# Patient Record
Sex: FEMALE | Race: WHITE | NOT HISPANIC OR LATINO | Employment: OTHER | URBAN - METROPOLITAN AREA
[De-identification: names, ages, dates, MRNs, and addresses within clinical notes are randomized per-mention and may not be internally consistent; named-entity substitution may affect disease eponyms.]

---

## 2017-06-12 ENCOUNTER — RECORDS - HEALTHEAST (OUTPATIENT)
Dept: LAB | Facility: CLINIC | Age: 67
End: 2017-06-12

## 2017-06-12 LAB
CHOLEST SERPL-MCNC: 175 MG/DL
FASTING STATUS PATIENT QL REPORTED: ABNORMAL
HDLC SERPL-MCNC: 49 MG/DL
LDLC SERPL CALC-MCNC: 101 MG/DL
TRIGL SERPL-MCNC: 125 MG/DL

## 2018-01-08 ENCOUNTER — RECORDS - HEALTHEAST (OUTPATIENT)
Dept: LAB | Facility: CLINIC | Age: 68
End: 2018-01-08

## 2018-01-08 LAB
ANION GAP SERPL CALCULATED.3IONS-SCNC: 11 MMOL/L (ref 5–18)
BUN SERPL-MCNC: 13 MG/DL (ref 8–22)
CALCIUM SERPL-MCNC: 9.5 MG/DL (ref 8.5–10.5)
CHLORIDE BLD-SCNC: 103 MMOL/L (ref 98–107)
CHOLEST SERPL-MCNC: 209 MG/DL
CO2 SERPL-SCNC: 24 MMOL/L (ref 22–31)
CREAT SERPL-MCNC: 0.73 MG/DL (ref 0.6–1.1)
FASTING STATUS PATIENT QL REPORTED: ABNORMAL
GFR SERPL CREATININE-BSD FRML MDRD: >60 ML/MIN/1.73M2
GLUCOSE BLD-MCNC: 94 MG/DL (ref 70–125)
HDLC SERPL-MCNC: 55 MG/DL
LDLC SERPL CALC-MCNC: 130 MG/DL
POTASSIUM BLD-SCNC: 3.9 MMOL/L (ref 3.5–5)
SODIUM SERPL-SCNC: 138 MMOL/L (ref 136–145)
TRIGL SERPL-MCNC: 122 MG/DL

## 2018-01-09 LAB — 25(OH)D3 SERPL-MCNC: 62.7 NG/ML (ref 30–80)

## 2018-06-11 ENCOUNTER — RECORDS - HEALTHEAST (OUTPATIENT)
Dept: LAB | Facility: CLINIC | Age: 68
End: 2018-06-11

## 2018-06-11 LAB
ANION GAP SERPL CALCULATED.3IONS-SCNC: 10 MMOL/L (ref 5–18)
BUN SERPL-MCNC: 13 MG/DL (ref 8–22)
CALCIUM SERPL-MCNC: 10 MG/DL (ref 8.5–10.5)
CHLORIDE BLD-SCNC: 104 MMOL/L (ref 98–107)
CHOLEST SERPL-MCNC: 156 MG/DL
CO2 SERPL-SCNC: 25 MMOL/L (ref 22–31)
CREAT SERPL-MCNC: 0.76 MG/DL (ref 0.6–1.1)
FASTING STATUS PATIENT QL REPORTED: NORMAL
GFR SERPL CREATININE-BSD FRML MDRD: >60 ML/MIN/1.73M2
GLUCOSE BLD-MCNC: 101 MG/DL (ref 70–125)
HDLC SERPL-MCNC: 53 MG/DL
LDLC SERPL CALC-MCNC: 90 MG/DL
MAGNESIUM SERPL-MCNC: 1.9 MG/DL (ref 1.8–2.6)
POTASSIUM BLD-SCNC: 3.7 MMOL/L (ref 3.5–5)
SODIUM SERPL-SCNC: 139 MMOL/L (ref 136–145)
TRIGL SERPL-MCNC: 64 MG/DL

## 2018-11-12 ENCOUNTER — RECORDS - HEALTHEAST (OUTPATIENT)
Dept: LAB | Facility: CLINIC | Age: 68
End: 2018-11-12

## 2018-11-12 LAB
ANION GAP SERPL CALCULATED.3IONS-SCNC: 10 MMOL/L (ref 5–18)
BUN SERPL-MCNC: 17 MG/DL (ref 8–22)
CALCIUM SERPL-MCNC: 9.9 MG/DL (ref 8.5–10.5)
CHLORIDE BLD-SCNC: 102 MMOL/L (ref 98–107)
CHOLEST SERPL-MCNC: 184 MG/DL
CO2 SERPL-SCNC: 28 MMOL/L (ref 22–31)
CREAT SERPL-MCNC: 0.73 MG/DL (ref 0.6–1.1)
FASTING STATUS PATIENT QL REPORTED: YES
GFR SERPL CREATININE-BSD FRML MDRD: >60 ML/MIN/1.73M2
GLUCOSE BLD-MCNC: 93 MG/DL (ref 70–125)
HDLC SERPL-MCNC: 75 MG/DL
LDLC SERPL CALC-MCNC: 99 MG/DL
POTASSIUM BLD-SCNC: 4 MMOL/L (ref 3.5–5)
SODIUM SERPL-SCNC: 140 MMOL/L (ref 136–145)
TRIGL SERPL-MCNC: 50 MG/DL

## 2019-06-14 ENCOUNTER — RECORDS - HEALTHEAST (OUTPATIENT)
Dept: LAB | Facility: CLINIC | Age: 69
End: 2019-06-14

## 2019-06-14 LAB
ANION GAP SERPL CALCULATED.3IONS-SCNC: 14 MMOL/L (ref 5–18)
BUN SERPL-MCNC: 13 MG/DL (ref 8–22)
CALCIUM SERPL-MCNC: 9.8 MG/DL (ref 8.5–10.5)
CHLORIDE BLD-SCNC: 100 MMOL/L (ref 98–107)
CHOLEST SERPL-MCNC: 173 MG/DL
CO2 SERPL-SCNC: 22 MMOL/L (ref 22–31)
CREAT SERPL-MCNC: 0.78 MG/DL (ref 0.6–1.1)
FASTING STATUS PATIENT QL REPORTED: NORMAL
GFR SERPL CREATININE-BSD FRML MDRD: >60 ML/MIN/1.73M2
GLUCOSE BLD-MCNC: 95 MG/DL (ref 70–125)
HDLC SERPL-MCNC: 68 MG/DL
LDLC SERPL CALC-MCNC: 95 MG/DL
MAGNESIUM SERPL-MCNC: 1.9 MG/DL (ref 1.8–2.6)
POTASSIUM BLD-SCNC: 3.8 MMOL/L (ref 3.5–5)
SODIUM SERPL-SCNC: 136 MMOL/L (ref 136–145)
TRIGL SERPL-MCNC: 48 MG/DL

## 2019-06-17 LAB — 25(OH)D3 SERPL-MCNC: 61.7 NG/ML (ref 30–80)

## 2019-12-19 ENCOUNTER — RECORDS - HEALTHEAST (OUTPATIENT)
Dept: LAB | Facility: CLINIC | Age: 69
End: 2019-12-19

## 2019-12-19 LAB
ANION GAP SERPL CALCULATED.3IONS-SCNC: 7 MMOL/L (ref 5–18)
BUN SERPL-MCNC: 14 MG/DL (ref 8–22)
CALCIUM SERPL-MCNC: 9.7 MG/DL (ref 8.5–10.5)
CHLORIDE BLD-SCNC: 103 MMOL/L (ref 98–107)
CHOLEST SERPL-MCNC: 189 MG/DL
CO2 SERPL-SCNC: 27 MMOL/L (ref 22–31)
CREAT SERPL-MCNC: 0.78 MG/DL (ref 0.6–1.1)
FASTING STATUS PATIENT QL REPORTED: NORMAL
GFR SERPL CREATININE-BSD FRML MDRD: >60 ML/MIN/1.73M2
GLUCOSE BLD-MCNC: 109 MG/DL (ref 70–125)
HDLC SERPL-MCNC: 74 MG/DL
LDLC SERPL CALC-MCNC: 106 MG/DL
POTASSIUM BLD-SCNC: 4 MMOL/L (ref 3.5–5)
SODIUM SERPL-SCNC: 137 MMOL/L (ref 136–145)
TRIGL SERPL-MCNC: 43 MG/DL

## 2020-06-22 ENCOUNTER — RECORDS - HEALTHEAST (OUTPATIENT)
Dept: LAB | Facility: CLINIC | Age: 70
End: 2020-06-22

## 2020-06-22 LAB
ANION GAP SERPL CALCULATED.3IONS-SCNC: 13 MMOL/L (ref 5–18)
BUN SERPL-MCNC: 11 MG/DL (ref 8–28)
CALCIUM SERPL-MCNC: 9.7 MG/DL (ref 8.5–10.5)
CHLORIDE BLD-SCNC: 97 MMOL/L (ref 98–107)
CHOLEST SERPL-MCNC: 126 MG/DL
CO2 SERPL-SCNC: 23 MMOL/L (ref 22–31)
CREAT SERPL-MCNC: 0.78 MG/DL (ref 0.6–1.1)
FASTING STATUS PATIENT QL REPORTED: ABNORMAL
GFR SERPL CREATININE-BSD FRML MDRD: >60 ML/MIN/1.73M2
GLUCOSE BLD-MCNC: 75 MG/DL (ref 70–125)
HDLC SERPL-MCNC: 48 MG/DL
LDLC SERPL CALC-MCNC: 66 MG/DL
MAGNESIUM SERPL-MCNC: 1.9 MG/DL (ref 1.8–2.6)
POTASSIUM BLD-SCNC: 3.3 MMOL/L (ref 3.5–5)
SODIUM SERPL-SCNC: 133 MMOL/L (ref 136–145)
TRIGL SERPL-MCNC: 60 MG/DL

## 2020-06-23 LAB — 25(OH)D3 SERPL-MCNC: 69.4 NG/ML (ref 30–80)

## 2020-10-02 ENCOUNTER — RECORDS - HEALTHEAST (OUTPATIENT)
Dept: LAB | Facility: CLINIC | Age: 70
End: 2020-10-02

## 2020-10-02 LAB — HIV 1+2 AB+HIV1 P24 AG SERPL QL IA: NEGATIVE

## 2020-10-05 LAB — HCV AB SERPL QL IA: NEGATIVE

## 2020-10-06 LAB
C TRACH DNA SPEC QL PROBE+SIG AMP: NEGATIVE
N GONORRHOEA DNA SPEC QL NAA+PROBE: NEGATIVE

## 2020-12-17 ENCOUNTER — RECORDS - HEALTHEAST (OUTPATIENT)
Dept: LAB | Facility: CLINIC | Age: 70
End: 2020-12-17

## 2020-12-17 LAB
ANION GAP SERPL CALCULATED.3IONS-SCNC: 10 MMOL/L (ref 5–18)
BUN SERPL-MCNC: 18 MG/DL (ref 8–28)
CALCIUM SERPL-MCNC: 9.5 MG/DL (ref 8.5–10.5)
CHLORIDE BLD-SCNC: 104 MMOL/L (ref 98–107)
CHOLEST SERPL-MCNC: 182 MG/DL
CO2 SERPL-SCNC: 24 MMOL/L (ref 22–31)
CREAT SERPL-MCNC: 0.83 MG/DL (ref 0.6–1.1)
FASTING STATUS PATIENT QL REPORTED: NORMAL
GFR SERPL CREATININE-BSD FRML MDRD: >60 ML/MIN/1.73M2
GLUCOSE BLD-MCNC: 105 MG/DL (ref 70–125)
HDLC SERPL-MCNC: 69 MG/DL
LDLC SERPL CALC-MCNC: 90 MG/DL
POTASSIUM BLD-SCNC: 3.7 MMOL/L (ref 3.5–5)
SODIUM SERPL-SCNC: 138 MMOL/L (ref 136–145)
TRIGL SERPL-MCNC: 113 MG/DL

## 2021-03-29 ENCOUNTER — RECORDS - HEALTHEAST (OUTPATIENT)
Dept: ADMINISTRATIVE | Facility: OTHER | Age: 71
End: 2021-03-29

## 2021-05-19 ENCOUNTER — RECORDS - HEALTHEAST (OUTPATIENT)
Dept: LAB | Facility: CLINIC | Age: 71
End: 2021-05-19

## 2021-05-19 LAB
ANION GAP SERPL CALCULATED.3IONS-SCNC: 10 MMOL/L (ref 5–18)
BUN SERPL-MCNC: 14 MG/DL (ref 8–28)
CALCIUM SERPL-MCNC: 8.9 MG/DL (ref 8.5–10.5)
CHLORIDE BLD-SCNC: 100 MMOL/L (ref 98–107)
CHOLEST SERPL-MCNC: 162 MG/DL
CO2 SERPL-SCNC: 24 MMOL/L (ref 22–31)
CREAT SERPL-MCNC: 0.75 MG/DL (ref 0.6–1.1)
FASTING STATUS PATIENT QL REPORTED: NORMAL
GFR SERPL CREATININE-BSD FRML MDRD: >60 ML/MIN/1.73M2
GLUCOSE BLD-MCNC: 101 MG/DL (ref 70–125)
HDLC SERPL-MCNC: 59 MG/DL
LDLC SERPL CALC-MCNC: 89 MG/DL
POTASSIUM BLD-SCNC: 3.4 MMOL/L (ref 3.5–5)
SODIUM SERPL-SCNC: 134 MMOL/L (ref 136–145)
TRIGL SERPL-MCNC: 68 MG/DL

## 2021-05-20 LAB — 25(OH)D3 SERPL-MCNC: 54.4 NG/ML (ref 30–80)

## 2021-05-24 ENCOUNTER — RECORDS - HEALTHEAST (OUTPATIENT)
Dept: ADMINISTRATIVE | Facility: CLINIC | Age: 71
End: 2021-05-24

## 2021-05-28 ENCOUNTER — RECORDS - HEALTHEAST (OUTPATIENT)
Dept: ADMINISTRATIVE | Facility: CLINIC | Age: 71
End: 2021-05-28

## 2021-05-29 ENCOUNTER — RECORDS - HEALTHEAST (OUTPATIENT)
Dept: ADMINISTRATIVE | Facility: CLINIC | Age: 71
End: 2021-05-29

## 2021-06-01 ENCOUNTER — RECORDS - HEALTHEAST (OUTPATIENT)
Dept: ADMINISTRATIVE | Facility: CLINIC | Age: 71
End: 2021-06-01

## 2021-06-02 ENCOUNTER — RECORDS - HEALTHEAST (OUTPATIENT)
Dept: ADMINISTRATIVE | Facility: CLINIC | Age: 71
End: 2021-06-02

## 2021-07-13 ENCOUNTER — RECORDS - HEALTHEAST (OUTPATIENT)
Dept: ADMINISTRATIVE | Facility: CLINIC | Age: 71
End: 2021-07-13

## 2021-07-21 ENCOUNTER — RECORDS - HEALTHEAST (OUTPATIENT)
Dept: ADMINISTRATIVE | Facility: CLINIC | Age: 71
End: 2021-07-21

## 2021-11-24 ENCOUNTER — LAB REQUISITION (OUTPATIENT)
Dept: LAB | Facility: CLINIC | Age: 71
End: 2021-11-24
Payer: MEDICARE

## 2021-11-24 DIAGNOSIS — I10 ESSENTIAL (PRIMARY) HYPERTENSION: ICD-10-CM

## 2021-11-24 DIAGNOSIS — E78.2 MIXED HYPERLIPIDEMIA: ICD-10-CM

## 2021-11-24 LAB
ANION GAP SERPL CALCULATED.3IONS-SCNC: 10 MMOL/L (ref 5–18)
BUN SERPL-MCNC: 17 MG/DL (ref 8–28)
CALCIUM SERPL-MCNC: 10 MG/DL (ref 8.5–10.5)
CHLORIDE BLD-SCNC: 102 MMOL/L (ref 98–107)
CHOLEST SERPL-MCNC: 181 MG/DL
CO2 SERPL-SCNC: 26 MMOL/L (ref 22–31)
CREAT SERPL-MCNC: 0.75 MG/DL (ref 0.6–1.1)
GFR SERPL CREATININE-BSD FRML MDRD: 80 ML/MIN/1.73M2
GLUCOSE BLD-MCNC: 99 MG/DL (ref 70–125)
HDLC SERPL-MCNC: 69 MG/DL
LDLC SERPL CALC-MCNC: 89 MG/DL
MAGNESIUM SERPL-MCNC: 2 MG/DL (ref 1.8–2.6)
POTASSIUM BLD-SCNC: 5.1 MMOL/L (ref 3.5–5)
SODIUM SERPL-SCNC: 138 MMOL/L (ref 136–145)
TRIGL SERPL-MCNC: 115 MG/DL

## 2021-11-24 PROCEDURE — 80048 BASIC METABOLIC PNL TOTAL CA: CPT | Mod: ORL | Performed by: FAMILY MEDICINE

## 2021-11-24 PROCEDURE — 83735 ASSAY OF MAGNESIUM: CPT | Mod: ORL | Performed by: FAMILY MEDICINE

## 2021-11-24 PROCEDURE — 80061 LIPID PANEL: CPT | Mod: ORL | Performed by: FAMILY MEDICINE

## 2022-05-23 ENCOUNTER — LAB REQUISITION (OUTPATIENT)
Dept: LAB | Facility: CLINIC | Age: 72
End: 2022-05-23
Payer: MEDICARE

## 2022-05-23 ENCOUNTER — TRANSFERRED RECORDS (OUTPATIENT)
Dept: HEALTH INFORMATION MANAGEMENT | Facility: CLINIC | Age: 72
End: 2022-05-23

## 2022-05-23 DIAGNOSIS — Z01.818 ENCOUNTER FOR OTHER PREPROCEDURAL EXAMINATION: ICD-10-CM

## 2022-05-23 LAB
ANION GAP SERPL CALCULATED.3IONS-SCNC: 9 MMOL/L (ref 5–18)
BUN SERPL-MCNC: 18 MG/DL (ref 8–28)
CALCIUM SERPL-MCNC: 9.7 MG/DL (ref 8.5–10.5)
CHLORIDE BLD-SCNC: 101 MMOL/L (ref 98–107)
CO2 SERPL-SCNC: 29 MMOL/L (ref 22–31)
CREAT SERPL-MCNC: 0.72 MG/DL (ref 0.6–1.1)
GFR SERPL CREATININE-BSD FRML MDRD: 88 ML/MIN/1.73M2
GLUCOSE BLD-MCNC: 63 MG/DL (ref 70–125)
POTASSIUM BLD-SCNC: 3.6 MMOL/L (ref 3.5–5)
SODIUM SERPL-SCNC: 139 MMOL/L (ref 136–145)

## 2022-05-23 PROCEDURE — 80048 BASIC METABOLIC PNL TOTAL CA: CPT | Mod: ORL | Performed by: FAMILY MEDICINE

## 2022-06-17 RX ORDER — LORATADINE 10 MG/1
10 TABLET ORAL DAILY
COMMUNITY

## 2022-06-17 RX ORDER — TRIAMTERENE AND HYDROCHLOROTHIAZIDE 37.5; 25 MG/1; MG/1
CAPSULE ORAL EVERY MORNING
Status: ON HOLD | COMMUNITY
End: 2022-06-22

## 2022-06-17 RX ORDER — CHLORAL HYDRATE 500 MG
1000 CAPSULE ORAL 2 TIMES DAILY
COMMUNITY

## 2022-06-17 RX ORDER — SODIUM PHOSPHATE,MONO-DIBASIC 19G-7G/118
1 ENEMA (ML) RECTAL DAILY
COMMUNITY

## 2022-06-17 RX ORDER — UBIDECARENONE 100 MG
100 CAPSULE ORAL DAILY
COMMUNITY

## 2022-06-18 ENCOUNTER — LAB (OUTPATIENT)
Dept: FAMILY MEDICINE | Facility: CLINIC | Age: 72
End: 2022-06-18
Payer: MEDICARE

## 2022-06-18 DIAGNOSIS — Z20.822 ENCOUNTER FOR LABORATORY TESTING FOR COVID-19 VIRUS: ICD-10-CM

## 2022-06-18 PROCEDURE — U0003 INFECTIOUS AGENT DETECTION BY NUCLEIC ACID (DNA OR RNA); SEVERE ACUTE RESPIRATORY SYNDROME CORONAVIRUS 2 (SARS-COV-2) (CORONAVIRUS DISEASE [COVID-19]), AMPLIFIED PROBE TECHNIQUE, MAKING USE OF HIGH THROUGHPUT TECHNOLOGIES AS DESCRIBED BY CMS-2020-01-R: HCPCS

## 2022-06-18 PROCEDURE — U0005 INFEC AGEN DETEC AMPLI PROBE: HCPCS

## 2022-06-19 LAB — SARS-COV-2 RNA RESP QL NAA+PROBE: NEGATIVE

## 2022-06-21 ENCOUNTER — ANESTHESIA EVENT (OUTPATIENT)
Dept: SURGERY | Facility: CLINIC | Age: 72
End: 2022-06-21
Payer: MEDICARE

## 2022-06-22 ENCOUNTER — ANESTHESIA (OUTPATIENT)
Dept: SURGERY | Facility: CLINIC | Age: 72
End: 2022-06-22
Payer: MEDICARE

## 2022-06-22 ENCOUNTER — HOSPITAL ENCOUNTER (OUTPATIENT)
Facility: CLINIC | Age: 72
Discharge: HOME | End: 2022-06-23
Attending: ORTHOPAEDIC SURGERY | Admitting: ORTHOPAEDIC SURGERY
Payer: MEDICARE

## 2022-06-22 ENCOUNTER — APPOINTMENT (OUTPATIENT)
Dept: RADIOLOGY | Facility: CLINIC | Age: 72
End: 2022-06-22
Attending: PHYSICIAN ASSISTANT
Payer: MEDICARE

## 2022-06-22 DIAGNOSIS — I10 PRIMARY HYPERTENSION: ICD-10-CM

## 2022-06-22 DIAGNOSIS — Z98.890 STATUS POST SHOULDER SURGERY: Primary | ICD-10-CM

## 2022-06-22 LAB
ABO/RH(D): NORMAL
ANTIBODY SCREEN: NEGATIVE
APTT PPP: 31 SECONDS (ref 22–38)
CREAT SERPL-MCNC: 0.74 MG/DL (ref 0.6–1.1)
ERYTHROCYTE [DISTWIDTH] IN BLOOD BY AUTOMATED COUNT: 12.6 % (ref 10–15)
GFR SERPL CREATININE-BSD FRML MDRD: 85 ML/MIN/1.73M2
HCT VFR BLD AUTO: 42.3 % (ref 35–47)
HGB BLD-MCNC: 13.6 G/DL (ref 11.7–15.7)
INR PPP: 1.02 (ref 0.85–1.15)
MCH RBC QN AUTO: 30.7 PG (ref 26.5–33)
MCHC RBC AUTO-ENTMCNC: 32.2 G/DL (ref 31.5–36.5)
MCV RBC AUTO: 96 FL (ref 78–100)
PLATELET # BLD AUTO: 210 10E3/UL (ref 150–450)
POTASSIUM BLD-SCNC: 3.7 MMOL/L (ref 3.5–5)
RBC # BLD AUTO: 4.43 10E6/UL (ref 3.8–5.2)
SPECIMEN EXPIRATION DATE: NORMAL
WBC # BLD AUTO: 5.4 10E3/UL (ref 4–11)

## 2022-06-22 PROCEDURE — C1713 ANCHOR/SCREW BN/BN,TIS/BN: HCPCS | Performed by: ORTHOPAEDIC SURGERY

## 2022-06-22 PROCEDURE — 250N000013 HC RX MED GY IP 250 OP 250 PS 637: Performed by: HOSPITALIST

## 2022-06-22 PROCEDURE — 85730 THROMBOPLASTIN TIME PARTIAL: CPT | Mod: GZ | Performed by: PHYSICIAN ASSISTANT

## 2022-06-22 PROCEDURE — 250N000009 HC RX 250: Performed by: NURSE ANESTHETIST, CERTIFIED REGISTERED

## 2022-06-22 PROCEDURE — 250N000011 HC RX IP 250 OP 636

## 2022-06-22 PROCEDURE — 250N000011 HC RX IP 250 OP 636: Performed by: ORTHOPAEDIC SURGERY

## 2022-06-22 PROCEDURE — 258N000003 HC RX IP 258 OP 636: Performed by: PHYSICIAN ASSISTANT

## 2022-06-22 PROCEDURE — 360N000077 HC SURGERY LEVEL 4, PER MIN: Performed by: ORTHOPAEDIC SURGERY

## 2022-06-22 PROCEDURE — 999N000065 XR SHOULDER RIGHT PORT G/E 2 VIEWS: Mod: RT

## 2022-06-22 PROCEDURE — 250N000013 HC RX MED GY IP 250 OP 250 PS 637: Performed by: PHYSICIAN ASSISTANT

## 2022-06-22 PROCEDURE — 999N000141 HC STATISTIC PRE-PROCEDURE NURSING ASSESSMENT: Performed by: ORTHOPAEDIC SURGERY

## 2022-06-22 PROCEDURE — 258N000003 HC RX IP 258 OP 636: Performed by: ANESTHESIOLOGY

## 2022-06-22 PROCEDURE — 82565 ASSAY OF CREATININE: CPT | Performed by: PHYSICIAN ASSISTANT

## 2022-06-22 PROCEDURE — 250N000009 HC RX 250

## 2022-06-22 PROCEDURE — 84132 ASSAY OF SERUM POTASSIUM: CPT | Performed by: PHYSICIAN ASSISTANT

## 2022-06-22 PROCEDURE — 250N000013 HC RX MED GY IP 250 OP 250 PS 637: Performed by: ANESTHESIOLOGY

## 2022-06-22 PROCEDURE — C1776 JOINT DEVICE (IMPLANTABLE): HCPCS | Performed by: ORTHOPAEDIC SURGERY

## 2022-06-22 PROCEDURE — 36415 COLL VENOUS BLD VENIPUNCTURE: CPT | Performed by: PHYSICIAN ASSISTANT

## 2022-06-22 PROCEDURE — 370N000017 HC ANESTHESIA TECHNICAL FEE, PER MIN: Performed by: ORTHOPAEDIC SURGERY

## 2022-06-22 PROCEDURE — 99213 OFFICE O/P EST LOW 20 MIN: CPT | Performed by: HOSPITALIST

## 2022-06-22 PROCEDURE — 85610 PROTHROMBIN TIME: CPT | Performed by: PHYSICIAN ASSISTANT

## 2022-06-22 PROCEDURE — C9290 INJ, BUPIVACAINE LIPOSOME: HCPCS | Performed by: PHYSICIAN ASSISTANT

## 2022-06-22 PROCEDURE — 86850 RBC ANTIBODY SCREEN: CPT | Performed by: PHYSICIAN ASSISTANT

## 2022-06-22 PROCEDURE — 250N000011 HC RX IP 250 OP 636: Performed by: PHYSICIAN ASSISTANT

## 2022-06-22 PROCEDURE — 250N000009 HC RX 250: Performed by: ANESTHESIOLOGY

## 2022-06-22 PROCEDURE — 258N000003 HC RX IP 258 OP 636: Performed by: NURSE ANESTHETIST, CERTIFIED REGISTERED

## 2022-06-22 PROCEDURE — 250N000011 HC RX IP 250 OP 636: Performed by: ANESTHESIOLOGY

## 2022-06-22 PROCEDURE — 250N000011 HC RX IP 250 OP 636: Performed by: NURSE ANESTHETIST, CERTIFIED REGISTERED

## 2022-06-22 PROCEDURE — 250N000009 HC RX 250: Performed by: ORTHOPAEDIC SURGERY

## 2022-06-22 PROCEDURE — 85014 HEMATOCRIT: CPT | Performed by: PHYSICIAN ASSISTANT

## 2022-06-22 PROCEDURE — 250N000025 HC SEVOFLURANE, PER MIN: Performed by: ORTHOPAEDIC SURGERY

## 2022-06-22 PROCEDURE — 272N000001 HC OR GENERAL SUPPLY STERILE: Performed by: ORTHOPAEDIC SURGERY

## 2022-06-22 PROCEDURE — 710N000010 HC RECOVERY PHASE 1, LEVEL 2, PER MIN: Performed by: ORTHOPAEDIC SURGERY

## 2022-06-22 DEVICE — IMPLANTABLE DEVICE
Type: IMPLANTABLE DEVICE | Site: SHOULDER | Status: FUNCTIONAL
Brand: FLEX SHOULDER SYSTEM

## 2022-06-22 DEVICE — SCREW PERIPHERAL 22MM: Type: IMPLANTABLE DEVICE | Site: SHOULDER | Status: FUNCTIONAL

## 2022-06-22 DEVICE — SCREW BSPLT 25MM 6.5MM AQLS PRFRM GLND RVRS CNTR DWJ125: Type: IMPLANTABLE DEVICE | Site: SHOULDER | Status: FUNCTIONAL

## 2022-06-22 DEVICE — SCREW PERIPHERAL 26MM: Type: IMPLANTABLE DEVICE | Site: SHOULDER | Status: FUNCTIONAL

## 2022-06-22 DEVICE — BASEPLATE STD 25MM: Type: IMPLANTABLE DEVICE | Site: SHOULDER | Status: FUNCTIONAL

## 2022-06-22 DEVICE — SCREW PERIPHERAL 14MM DWJ314: Type: IMPLANTABLE DEVICE | Site: SHOULDER | Status: FUNCTIONAL

## 2022-06-22 DEVICE — IMPLANTABLE DEVICE
Type: IMPLANTABLE DEVICE | Site: SHOULDER | Status: FUNCTIONAL
Brand: TORNIER FLEX SHOULDER SYSTEM

## 2022-06-22 DEVICE — IMPLANTABLE DEVICE
Type: IMPLANTABLE DEVICE | Site: SHOULDER | Status: FUNCTIONAL
Brand: AEQUALIS™ PERFORM REVERSED

## 2022-06-22 RX ORDER — BISACODYL 10 MG
10 SUPPOSITORY, RECTAL RECTAL DAILY PRN
Status: DISCONTINUED | OUTPATIENT
Start: 2022-06-22 | End: 2022-06-23 | Stop reason: HOSPADM

## 2022-06-22 RX ORDER — HYDROMORPHONE HCL IN WATER/PF 6 MG/30 ML
0.4 PATIENT CONTROLLED ANALGESIA SYRINGE INTRAVENOUS EVERY 5 MIN PRN
Status: DISCONTINUED | OUTPATIENT
Start: 2022-06-22 | End: 2022-06-22 | Stop reason: HOSPADM

## 2022-06-22 RX ORDER — TRAMADOL HYDROCHLORIDE 50 MG/1
50 TABLET ORAL EVERY 6 HOURS PRN
Status: DISCONTINUED | OUTPATIENT
Start: 2022-06-22 | End: 2022-06-23 | Stop reason: HOSPADM

## 2022-06-22 RX ORDER — FENTANYL CITRATE 50 UG/ML
50 INJECTION, SOLUTION INTRAMUSCULAR; INTRAVENOUS EVERY 5 MIN PRN
Status: DISCONTINUED | OUTPATIENT
Start: 2022-06-22 | End: 2022-06-22 | Stop reason: HOSPADM

## 2022-06-22 RX ORDER — ONDANSETRON 2 MG/ML
4 INJECTION INTRAMUSCULAR; INTRAVENOUS EVERY 6 HOURS PRN
Status: DISCONTINUED | OUTPATIENT
Start: 2022-06-22 | End: 2022-06-23 | Stop reason: HOSPADM

## 2022-06-22 RX ORDER — NALOXONE HYDROCHLORIDE 0.4 MG/ML
0.4 INJECTION, SOLUTION INTRAMUSCULAR; INTRAVENOUS; SUBCUTANEOUS
Status: DISCONTINUED | OUTPATIENT
Start: 2022-06-22 | End: 2022-06-23 | Stop reason: HOSPADM

## 2022-06-22 RX ORDER — NALOXONE HYDROCHLORIDE 0.4 MG/ML
0.2 INJECTION, SOLUTION INTRAMUSCULAR; INTRAVENOUS; SUBCUTANEOUS
Status: DISCONTINUED | OUTPATIENT
Start: 2022-06-22 | End: 2022-06-23 | Stop reason: HOSPADM

## 2022-06-22 RX ORDER — SODIUM CHLORIDE, SODIUM LACTATE, POTASSIUM CHLORIDE, CALCIUM CHLORIDE 600; 310; 30; 20 MG/100ML; MG/100ML; MG/100ML; MG/100ML
INJECTION, SOLUTION INTRAVENOUS CONTINUOUS
Status: DISCONTINUED | OUTPATIENT
Start: 2022-06-22 | End: 2022-06-23 | Stop reason: HOSPADM

## 2022-06-22 RX ORDER — HYDROCODONE BITARTRATE AND ACETAMINOPHEN 5; 325 MG/1; MG/1
1-2 TABLET ORAL EVERY 6 HOURS PRN
Status: DISCONTINUED | OUTPATIENT
Start: 2022-06-22 | End: 2022-06-22 | Stop reason: ALTCHOICE

## 2022-06-22 RX ORDER — CEFAZOLIN SODIUM 2 G/100ML
2 INJECTION, SOLUTION INTRAVENOUS EVERY 8 HOURS
Status: COMPLETED | OUTPATIENT
Start: 2022-06-22 | End: 2022-06-23

## 2022-06-22 RX ORDER — HYDROMORPHONE HCL IN WATER/PF 6 MG/30 ML
0.2 PATIENT CONTROLLED ANALGESIA SYRINGE INTRAVENOUS
Status: DISCONTINUED | OUTPATIENT
Start: 2022-06-22 | End: 2022-06-23 | Stop reason: HOSPADM

## 2022-06-22 RX ORDER — BUPIVACAINE HYDROCHLORIDE 5 MG/ML
INJECTION, SOLUTION EPIDURAL; INTRACAUDAL
Status: COMPLETED | OUTPATIENT
Start: 2022-06-22 | End: 2022-06-22

## 2022-06-22 RX ORDER — ONDANSETRON 4 MG/1
4 TABLET, ORALLY DISINTEGRATING ORAL EVERY 30 MIN PRN
Status: DISCONTINUED | OUTPATIENT
Start: 2022-06-22 | End: 2022-06-22 | Stop reason: HOSPADM

## 2022-06-22 RX ORDER — DEXAMETHASONE SODIUM PHOSPHATE 4 MG/ML
INJECTION, SOLUTION INTRA-ARTICULAR; INTRALESIONAL; INTRAMUSCULAR; INTRAVENOUS; SOFT TISSUE PRN
Status: DISCONTINUED | OUTPATIENT
Start: 2022-06-22 | End: 2022-06-22

## 2022-06-22 RX ORDER — ACETAMINOPHEN 325 MG/1
975 TABLET ORAL EVERY 8 HOURS
Status: DISCONTINUED | OUTPATIENT
Start: 2022-06-22 | End: 2022-06-23 | Stop reason: HOSPADM

## 2022-06-22 RX ORDER — ACETAMINOPHEN 325 MG/1
975 TABLET ORAL ONCE
Status: COMPLETED | OUTPATIENT
Start: 2022-06-22 | End: 2022-06-22

## 2022-06-22 RX ORDER — AMOXICILLIN 250 MG
1 CAPSULE ORAL 2 TIMES DAILY
Status: DISCONTINUED | OUTPATIENT
Start: 2022-06-22 | End: 2022-06-23 | Stop reason: HOSPADM

## 2022-06-22 RX ORDER — VANCOMYCIN HYDROCHLORIDE 1 G/20ML
1 INJECTION, POWDER, LYOPHILIZED, FOR SOLUTION INTRAVENOUS ONCE
Status: DISCONTINUED | OUTPATIENT
Start: 2022-06-22 | End: 2022-06-22 | Stop reason: HOSPADM

## 2022-06-22 RX ORDER — LIDOCAINE 40 MG/G
CREAM TOPICAL
Status: DISCONTINUED | OUTPATIENT
Start: 2022-06-22 | End: 2022-06-23 | Stop reason: HOSPADM

## 2022-06-22 RX ORDER — ACETAMINOPHEN 325 MG/1
650 TABLET ORAL EVERY 4 HOURS PRN
Status: DISCONTINUED | OUTPATIENT
Start: 2022-06-25 | End: 2022-06-23 | Stop reason: HOSPADM

## 2022-06-22 RX ORDER — MAGNESIUM HYDROXIDE 1200 MG/15ML
LIQUID ORAL PRN
Status: DISCONTINUED | OUTPATIENT
Start: 2022-06-22 | End: 2022-06-22 | Stop reason: HOSPADM

## 2022-06-22 RX ORDER — DIPHENHYDRAMINE HYDROCHLORIDE 50 MG/ML
12.5 INJECTION INTRAMUSCULAR; INTRAVENOUS EVERY 6 HOURS PRN
Status: DISCONTINUED | OUTPATIENT
Start: 2022-06-22 | End: 2022-06-22 | Stop reason: HOSPADM

## 2022-06-22 RX ORDER — CEFAZOLIN SODIUM/WATER 2 G/20 ML
2 SYRINGE (ML) INTRAVENOUS
Status: COMPLETED | OUTPATIENT
Start: 2022-06-22 | End: 2022-06-22

## 2022-06-22 RX ORDER — LIDOCAINE 40 MG/G
CREAM TOPICAL
Status: DISCONTINUED | OUTPATIENT
Start: 2022-06-22 | End: 2022-06-22 | Stop reason: HOSPADM

## 2022-06-22 RX ORDER — ACETAMINOPHEN 325 MG/1
975 TABLET ORAL ONCE
Status: DISCONTINUED | OUTPATIENT
Start: 2022-06-22 | End: 2022-06-22 | Stop reason: HOSPADM

## 2022-06-22 RX ORDER — ONDANSETRON 2 MG/ML
4 INJECTION INTRAMUSCULAR; INTRAVENOUS EVERY 30 MIN PRN
Status: DISCONTINUED | OUTPATIENT
Start: 2022-06-22 | End: 2022-06-22 | Stop reason: HOSPADM

## 2022-06-22 RX ORDER — LIDOCAINE HYDROCHLORIDE 10 MG/ML
INJECTION, SOLUTION INFILTRATION; PERINEURAL PRN
Status: DISCONTINUED | OUTPATIENT
Start: 2022-06-22 | End: 2022-06-22

## 2022-06-22 RX ORDER — LISINOPRIL 5 MG/1
5 TABLET ORAL DAILY
Status: DISCONTINUED | OUTPATIENT
Start: 2022-06-23 | End: 2022-06-23 | Stop reason: HOSPADM

## 2022-06-22 RX ORDER — SODIUM CHLORIDE, SODIUM LACTATE, POTASSIUM CHLORIDE, CALCIUM CHLORIDE 600; 310; 30; 20 MG/100ML; MG/100ML; MG/100ML; MG/100ML
INJECTION, SOLUTION INTRAVENOUS CONTINUOUS
Status: DISCONTINUED | OUTPATIENT
Start: 2022-06-22 | End: 2022-06-22 | Stop reason: HOSPADM

## 2022-06-22 RX ORDER — LORATADINE 10 MG/1
10 TABLET ORAL DAILY
Status: DISCONTINUED | OUTPATIENT
Start: 2022-06-23 | End: 2022-06-23 | Stop reason: HOSPADM

## 2022-06-22 RX ORDER — ATORVASTATIN CALCIUM 10 MG/1
10 TABLET, FILM COATED ORAL AT BEDTIME
Status: DISCONTINUED | OUTPATIENT
Start: 2022-06-22 | End: 2022-06-23 | Stop reason: HOSPADM

## 2022-06-22 RX ORDER — PROPOFOL 10 MG/ML
INJECTION, EMULSION INTRAVENOUS CONTINUOUS PRN
Status: DISCONTINUED | OUTPATIENT
Start: 2022-06-22 | End: 2022-06-22

## 2022-06-22 RX ORDER — FENTANYL CITRATE 50 UG/ML
INJECTION, SOLUTION INTRAMUSCULAR; INTRAVENOUS PRN
Status: DISCONTINUED | OUTPATIENT
Start: 2022-06-22 | End: 2022-06-22

## 2022-06-22 RX ORDER — TRIAMTERENE/HYDROCHLOROTHIAZID 37.5-25 MG
1 TABLET ORAL DAILY
COMMUNITY

## 2022-06-22 RX ORDER — HYDROCODONE BITARTRATE AND ACETAMINOPHEN 5; 325 MG/1; MG/1
1-2 TABLET ORAL EVERY 6 HOURS PRN
Qty: 25 TABLET | Refills: 0 | Status: SHIPPED | OUTPATIENT
Start: 2022-06-22 | End: 2024-04-22

## 2022-06-22 RX ORDER — LISINOPRIL 5 MG/1
5 TABLET ORAL AT BEDTIME
Status: DISCONTINUED | OUTPATIENT
Start: 2022-06-22 | End: 2022-06-22

## 2022-06-22 RX ORDER — FENTANYL CITRATE 50 UG/ML
50 INJECTION, SOLUTION INTRAMUSCULAR; INTRAVENOUS
Status: DISCONTINUED | OUTPATIENT
Start: 2022-06-22 | End: 2022-06-22 | Stop reason: HOSPADM

## 2022-06-22 RX ORDER — DIPHENHYDRAMINE HCL 12.5 MG/5ML
12.5 SOLUTION ORAL EVERY 6 HOURS PRN
Status: DISCONTINUED | OUTPATIENT
Start: 2022-06-22 | End: 2022-06-22 | Stop reason: HOSPADM

## 2022-06-22 RX ORDER — VANCOMYCIN HYDROCHLORIDE 1 G/20ML
INJECTION, POWDER, LYOPHILIZED, FOR SOLUTION INTRAVENOUS PRN
Status: DISCONTINUED | OUTPATIENT
Start: 2022-06-22 | End: 2022-06-22 | Stop reason: HOSPADM

## 2022-06-22 RX ORDER — DIAZEPAM 10 MG/2ML
2.5 INJECTION, SOLUTION INTRAMUSCULAR; INTRAVENOUS
Status: DISCONTINUED | OUTPATIENT
Start: 2022-06-22 | End: 2022-06-22 | Stop reason: HOSPADM

## 2022-06-22 RX ORDER — POLYETHYLENE GLYCOL 3350 17 G/17G
17 POWDER, FOR SOLUTION ORAL DAILY
Status: DISCONTINUED | OUTPATIENT
Start: 2022-06-23 | End: 2022-06-23 | Stop reason: HOSPADM

## 2022-06-22 RX ORDER — HYDROMORPHONE HCL IN WATER/PF 6 MG/30 ML
0.4 PATIENT CONTROLLED ANALGESIA SYRINGE INTRAVENOUS
Status: DISCONTINUED | OUTPATIENT
Start: 2022-06-22 | End: 2022-06-23 | Stop reason: HOSPADM

## 2022-06-22 RX ORDER — TRANEXAMIC ACID 650 MG/1
1950 TABLET ORAL ONCE
Status: COMPLETED | OUTPATIENT
Start: 2022-06-22 | End: 2022-06-22

## 2022-06-22 RX ORDER — HALOPERIDOL 5 MG/ML
1 INJECTION INTRAMUSCULAR
Status: DISCONTINUED | OUTPATIENT
Start: 2022-06-22 | End: 2022-06-22 | Stop reason: HOSPADM

## 2022-06-22 RX ORDER — GLYCINE 1.5 G/100ML
SOLUTION IRRIGATION PRN
Status: DISCONTINUED | OUTPATIENT
Start: 2022-06-22 | End: 2022-06-22 | Stop reason: HOSPADM

## 2022-06-22 RX ORDER — CEFAZOLIN SODIUM/WATER 2 G/20 ML
2 SYRINGE (ML) INTRAVENOUS SEE ADMIN INSTRUCTIONS
Status: DISCONTINUED | OUTPATIENT
Start: 2022-06-22 | End: 2022-06-22 | Stop reason: HOSPADM

## 2022-06-22 RX ORDER — EPHEDRINE SULFATE 50 MG/ML
INJECTION, SOLUTION INTRAMUSCULAR; INTRAVENOUS; SUBCUTANEOUS PRN
Status: DISCONTINUED | OUTPATIENT
Start: 2022-06-22 | End: 2022-06-22

## 2022-06-22 RX ORDER — ONDANSETRON 4 MG/1
4 TABLET, ORALLY DISINTEGRATING ORAL EVERY 6 HOURS PRN
Status: DISCONTINUED | OUTPATIENT
Start: 2022-06-22 | End: 2022-06-23 | Stop reason: HOSPADM

## 2022-06-22 RX ORDER — GLYCOPYRROLATE 0.2 MG/ML
INJECTION, SOLUTION INTRAMUSCULAR; INTRAVENOUS PRN
Status: DISCONTINUED | OUTPATIENT
Start: 2022-06-22 | End: 2022-06-22

## 2022-06-22 RX ORDER — PROCHLORPERAZINE MALEATE 5 MG
5 TABLET ORAL EVERY 6 HOURS PRN
Status: DISCONTINUED | OUTPATIENT
Start: 2022-06-22 | End: 2022-06-23 | Stop reason: HOSPADM

## 2022-06-22 RX ORDER — CALCIUM CARBONATE 500 MG/1
500 TABLET, CHEWABLE ORAL 4 TIMES DAILY PRN
Status: DISCONTINUED | OUTPATIENT
Start: 2022-06-22 | End: 2022-06-23 | Stop reason: HOSPADM

## 2022-06-22 RX ORDER — PROPOFOL 10 MG/ML
INJECTION, EMULSION INTRAVENOUS PRN
Status: DISCONTINUED | OUTPATIENT
Start: 2022-06-22 | End: 2022-06-22

## 2022-06-22 RX ADMIN — BUPIVACAINE HYDROCHLORIDE 15 ML: 5 INJECTION, SOLUTION EPIDURAL; INTRACAUDAL; PERINEURAL at 09:32

## 2022-06-22 RX ADMIN — Medication 5 MG: at 12:23

## 2022-06-22 RX ADMIN — SODIUM CHLORIDE, POTASSIUM CHLORIDE, SODIUM LACTATE AND CALCIUM CHLORIDE: 600; 310; 30; 20 INJECTION, SOLUTION INTRAVENOUS at 15:22

## 2022-06-22 RX ADMIN — ATORVASTATIN CALCIUM 10 MG: 10 TABLET, FILM COATED ORAL at 20:17

## 2022-06-22 RX ADMIN — LIDOCAINE HYDROCHLORIDE 2 ML: 10 INJECTION, SOLUTION INFILTRATION; PERINEURAL at 11:43

## 2022-06-22 RX ADMIN — ASPIRIN 325 MG: 325 TABLET ORAL at 17:11

## 2022-06-22 RX ADMIN — Medication 2 G: at 11:29

## 2022-06-22 RX ADMIN — PROPOFOL 200 MCG/KG/MIN: 10 INJECTION, EMULSION INTRAVENOUS at 11:43

## 2022-06-22 RX ADMIN — TRANEXAMIC ACID 1950 MG: 650 TABLET ORAL at 08:10

## 2022-06-22 RX ADMIN — FENTANYL CITRATE 50 MCG: 50 INJECTION, SOLUTION INTRAMUSCULAR; INTRAVENOUS at 09:30

## 2022-06-22 RX ADMIN — SUGAMMADEX 200 MG: 100 INJECTION, SOLUTION INTRAVENOUS at 13:38

## 2022-06-22 RX ADMIN — PHENYLEPHRINE HYDROCHLORIDE 0.3 MCG/KG/MIN: 10 INJECTION INTRAVENOUS at 12:04

## 2022-06-22 RX ADMIN — ACETAMINOPHEN 975 MG: 325 TABLET ORAL at 08:10

## 2022-06-22 RX ADMIN — SODIUM CHLORIDE, POTASSIUM CHLORIDE, SODIUM LACTATE AND CALCIUM CHLORIDE: 600; 310; 30; 20 INJECTION, SOLUTION INTRAVENOUS at 11:41

## 2022-06-22 RX ADMIN — FENTANYL CITRATE 50 MCG: 50 INJECTION, SOLUTION INTRAMUSCULAR; INTRAVENOUS at 13:04

## 2022-06-22 RX ADMIN — PROPOFOL 150 MG: 10 INJECTION, EMULSION INTRAVENOUS at 11:43

## 2022-06-22 RX ADMIN — PHENYLEPHRINE HYDROCHLORIDE 100 MCG: 10 INJECTION INTRAVENOUS at 13:32

## 2022-06-22 RX ADMIN — FENTANYL CITRATE 50 MCG: 50 INJECTION, SOLUTION INTRAMUSCULAR; INTRAVENOUS at 12:14

## 2022-06-22 RX ADMIN — CEFAZOLIN SODIUM 2 G: 2 INJECTION, SOLUTION INTRAVENOUS at 20:13

## 2022-06-22 RX ADMIN — ROCURONIUM BROMIDE 15 MG: 10 INJECTION, SOLUTION INTRAVENOUS at 12:33

## 2022-06-22 RX ADMIN — SENNOSIDES AND DOCUSATE SODIUM 1 TABLET: 50; 8.6 TABLET ORAL at 20:17

## 2022-06-22 RX ADMIN — ACETAMINOPHEN 975 MG: 325 TABLET ORAL at 15:22

## 2022-06-22 RX ADMIN — ROCURONIUM BROMIDE 40 MG: 10 INJECTION, SOLUTION INTRAVENOUS at 11:43

## 2022-06-22 RX ADMIN — MIDAZOLAM HYDROCHLORIDE 1 MG: 1 INJECTION, SOLUTION INTRAMUSCULAR; INTRAVENOUS at 09:30

## 2022-06-22 RX ADMIN — ROCURONIUM BROMIDE 10 MG: 10 INJECTION, SOLUTION INTRAVENOUS at 12:02

## 2022-06-22 RX ADMIN — FENTANYL CITRATE 50 MCG: 50 INJECTION, SOLUTION INTRAMUSCULAR; INTRAVENOUS at 11:43

## 2022-06-22 RX ADMIN — Medication 10 MG: at 12:10

## 2022-06-22 RX ADMIN — BUPIVACAINE 10 ML: 13.3 INJECTION, SUSPENSION, LIPOSOMAL INFILTRATION at 09:32

## 2022-06-22 RX ADMIN — Medication 10 MG: at 13:15

## 2022-06-22 RX ADMIN — SODIUM CHLORIDE, POTASSIUM CHLORIDE, SODIUM LACTATE AND CALCIUM CHLORIDE: 600; 310; 30; 20 INJECTION, SOLUTION INTRAVENOUS at 08:29

## 2022-06-22 RX ADMIN — SODIUM CHLORIDE, POTASSIUM CHLORIDE, SODIUM LACTATE AND CALCIUM CHLORIDE: 600; 310; 30; 20 INJECTION, SOLUTION INTRAVENOUS at 13:07

## 2022-06-22 RX ADMIN — GLYCOPYRROLATE 0.2 MG: 0.2 INJECTION, SOLUTION INTRAMUSCULAR; INTRAVENOUS at 12:06

## 2022-06-22 RX ADMIN — DEXAMETHASONE SODIUM PHOSPHATE 10 MG: 4 INJECTION, SOLUTION INTRA-ARTICULAR; INTRALESIONAL; INTRAMUSCULAR; INTRAVENOUS; SOFT TISSUE at 11:57

## 2022-06-22 NOTE — ANESTHESIA CARE TRANSFER NOTE
Patient: Fatimah Winkler    Procedure: Procedure(s):  RIGHT REVERSE TOTAL SHOULDER ARTHROPLASTY       Diagnosis: Right shoulder pain [M25.511]  Diagnosis Additional Information: No value filed.    Anesthesia Type:   General     Note:    Oropharynx: oropharynx clear of all foreign objects and spontaneously breathing  Level of Consciousness: drowsy  Oxygen Supplementation: face mask  Level of Supplemental Oxygen (L/min / FiO2): 8  Independent Airway: airway patency satisfactory and stable  Dentition: dentition unchanged  Vital Signs Stable: post-procedure vital signs reviewed and stable  Report to RN Given: handoff report given  Patient transferred to: PACU    Handoff Report: Identifed the Patient, Identified the Reponsible Provider, Reviewed the pertinent medical history, Discussed the surgical course, Reviewed Intra-OP anesthesia mangement and issues during anesthesia, Set expectations for post-procedure period and Allowed opportunity for questions and acknowledgement of understanding      Vitals:  Vitals Value Taken Time   /70 06/22/22 1400   Temp 36.4  C (97.6  F) 06/22/22 1358   Pulse 77 06/22/22 1402   Resp 21 06/22/22 1402   SpO2 98 % 06/22/22 1402   Vitals shown include unvalidated device data.    Electronically Signed By: BLAINE Muse CRNA  June 22, 2022  2:03 PM

## 2022-06-22 NOTE — ANESTHESIA POSTPROCEDURE EVALUATION
Patient: Fatimah Winkler    Procedure: Procedure(s):  RIGHT REVERSE TOTAL SHOULDER ARTHROPLASTY       Anesthesia Type:  General    Note:  Disposition: Inpatient   Postop Pain Control: Uneventful            Sign Out: Well controlled pain   PONV: No   Neuro/Psych: Uneventful            Sign Out: Acceptable/Baseline neuro status   Airway/Respiratory: Uneventful            Sign Out: Acceptable/Baseline resp. status   CV/Hemodynamics: Uneventful            Sign Out: Acceptable CV status; No obvious hypovolemia; No obvious fluid overload   Other NRE: NONE   DID A NON-ROUTINE EVENT OCCUR? No           Last vitals:  Vitals Value Taken Time   /63 06/22/22 1430   Temp 36.4  C (97.6  F) 06/22/22 1358   Pulse 70 06/22/22 1438   Resp 17 06/22/22 1434   SpO2 96 % 06/22/22 1438   Vitals shown include unvalidated device data.    Electronically Signed By: Jordon Sanchez MD  June 22, 2022  3:25 PM

## 2022-06-22 NOTE — OP NOTE
Operative Note    Name:  Fatimah Winkler  :  1950  MRN:  [id number]  Procedure Date:  2022    Preoperative Diagnosis:  Right Shoulder DJD and Rotator cuff tear    Postoperative Diagnosis:  Same    Procedures:  1.Right Reverse Total Shoulder Arthroplasty  2.Open biceps tenodesis    Surgeon(s):   Lj Kraus MD    Asst.   Adamaris Daily PA-C PA-C assistance was required due to the complexity of the procedure, for patient positioning, instrumentation assistance, soft tissue retraction and patient safety.      Circulator: Eve Pan RN; Jasmin Fritz RN  Scrub Person: Claudia Angelo Rhonda J      Anesthesia:   General and Interscalene block with Exparel     Estimated Blood Loss: 150cc    Condition on discharge from OR:  stable    Drains: none     Specimens: None    Tissue Removed, Not Sent: Humeral head    Complications: none     Disposition:  Stable and awake to postanesthesia recovery..      INDICATION FOR OPERATION:  Fatimah Winkler is a 72 year old female with a history of shoulder pain, weakness and stiffness and she has failed nonsurgical treatment. XR showed evidence of severe osteoarthritis of the shoulder. Recommended she undergo shoulder arthroplasty. Risks and benefits of the planned procedure as well as details of surgery were discussed with the patient. Consent was obtained.       REPORT OF OPERATION:   The patient was brought to operating room and placed supine on the operating table. An interscalene block was placed by Anesthesia preoperatively and she was given appropriate preoperative antibiotic. After induction of general anesthesia, she was placed in the beach-chair position on the operating room table. All bony prominences were well padded. The shoulder was prepped and draped in normal sterile fashion.    A standard anterior deltopectoral incision was utilized. Deep retractors were placed below the clavipectoral fascia and the deltoid. The humeral head was  inspected.  The rotator cuff was noted to intact with moderate tendonopathy and small hi grade partial tear of the anterior supraspinatus.   Biceps was tenodesed to the superior aspect of the pectoralis tendon. Subscapularis was released off the lesser tuberosity and tagged.    The humeral head was exposed and noted to have a wear pattern of the anterior and mid humeral head with large anterior and inferior humeral osteophytes, which were resected. Humeral head cut was then performed using appropriate instrumentation. The humerus was then reamed and broached, and a trial stem with a head protection plate was placed.    The glenoid was then exposed and noted to have moderate posterior wear pattern of the glenoid without significant retroversion. The labrum was excised circumferentially.  A large amount of synovial fluid and capsular synovitis was resected.  Next, the glenoid was drilled and reamed and prepared for glenoid implant. The glenoid baseplate was then impacted and the peripheral screws were placed. A trial glenosphere was placed.    The humerus was again exposed. The humeral component was trialed, and then a humeral implant was impacted into place.  The appropriate glenosphere implant was then placed and humeral polyethelene was then again trialed.  Next a standard humeral polyethelene cup and tray was impacted into place.    A final reduction was performed, and the shoulder was copiously irrigated with Irricept and saline irrigation. All instruments were removed. Subscapularis was repaired back to the lesser tuberosity with six #2 FiberWire sutures. The small supraspinatus tear was repaired with #2 ethibond sutures.  .The incison was again copiously irrigated with saline irrigation and 1 gram of vancomycin powder was placed in the shoulder joint and subdeltoid space.  The incision was closed in layers with #1 Ethibond closure of the deltopectoral split, 0 Vicryl and 2-0 Vicryl subcutaneous closure, and  skin staples. A sterile dressing was placed on the shoulder.     Postoperatively she was placed in a shoulder SlingShot brace and  transferred in stable, awake condition to Postanesthesia Recovery.  Following surgery she will be maintained in the sling for 4-6 weeks postoperatively, may discontinue abduction pillow at 1-2 week postoperative and begin PT at approximately 3 weeks postoperatively.        Lj Kraus MD   Date: 6/22/2022  Time: 1:48 PM    Implants:  Implant Name Type Inv. Item Serial No.  Lot No. LRB No. Used Action   BASEPLATE STD 25MM - [id number] Total Joint Component/Insert BASEPLATE STD 25MM [id number] [employer]  Right 1 Implanted   SCREW BSPLT 25MM 6.5MM AQLS PRFRM GLND RVRS CNTR [id number] - [id number] Metallic Hardware/[place] SCREW BSPLT 25MM 6.5MM AQLS PRFRM GLND RVRS CNTR [id number]  [employer]  Right 1 Implanted   SCREW PERIPHERAL 14MM [id number] - [id number] Metallic Hardware/[place] SCREW PERIPHERAL 14MM [id number]  [employer]  Right 2 Implanted   SCREW PERIPHERAL 26MM - [id number] Metallic Hardware/[place] SCREW PERIPHERAL 26MM  [employer]  Right 1 Implanted   SCREW PERIPHERAL 22MM - [id number] Metallic Hardware/[place] SCREW PERIPHERAL 22MM  [employer]  Right 1 Implanted   GLEOSPHERE LATERALIZED AP REVERSED 36MM - [id number] Total Joint Component/Insert GLEOSPHERE LATERALIZED AP REVERSED 36MM [id number] [employer]  Right 1 Implanted   STEM HUMERAL ANATOMIC STD PTC 5B - [id number] Total Joint Component/Insert STEM HUMERAL ANATOMIC STD PTC 5B [id number] [employer]  Right 1 Implanted   TRAY REVERSE CENTERED +0 - [id number] Total Joint Component/Insert TRAY REVERSE CENTERED +0 [id number] [employer]  Right 1 Implanted   INSERT REVISION REVERSE +6/12 36MM - [id number] Total Joint Component/Insert INSERT REVISION REVERSE +6/12 36MM [id number] [employer]  Right 1 Implanted

## 2022-06-22 NOTE — CONSULTS
"ACUPUNCTURIST TREATMENT NOTE    Name: Fatimah Winkler  :  1950  MRN:  [id number]    Acupuncture Treatment  Patient Type: Orthopedic  Intervention Reason: Desires Experience  Patient complaint:: Stiff neck  Initial insertions: Yin schwab, Gb 20, (L) Gb 21, Temporalis motor point     \"Risks and benefits of acupuncture were discussed with patient. Consent for treatment was given. We thank you for the referral.\"     Tye Allen    Date:  2022  Time:  3:56 PM    "

## 2022-06-22 NOTE — ANESTHESIA PROCEDURE NOTES
Interscalene Procedure Note    Pre-Procedure   Staff -        Anesthesiologist:  Jordon Sanchez MD       Performed By: anesthesiologist       Location: pre-op       Procedure Start/Stop Times: 6/22/2022 9:32 AM and 6/22/2022 9:35 AM       Pre-Anesthestic Checklist: patient identified, IV checked, site marked, risks and benefits discussed, informed consent, monitors and equipment checked, pre-op evaluation, at physician/surgeon's request and post-op pain management  Timeout:       Correct Patient: Yes        Correct Procedure: Yes        Correct Site: Yes        Correct Position: Yes        Correct Laterality: Yes        Site Marked: Yes  Procedure Documentation  Procedure: Interscalene       Laterality: right       Patient Position: supine       Patient Prep/Sterile Barriers: sterile gloves, mask       Skin prep: Chloraprep       Needle Type: other       Needle Gauge: 20.        Needle Length (Inches): 4        Ultrasound guided       1. Ultrasound was used to identify targeted nerve, plexus, vascular marker, or fascial plane and place a needle adjacent to it in real-time.       2. Ultrasound was used to visualize the spread of anesthetic in close proximity to the above referenced structure.       3. A permanent image is entered into the patient's record.       4. The visualized anatomic structures appeared normal.       5. There were no apparent abnormal pathologic findings.    Assessment/Narrative         The placement was negative for: blood aspirated, painful injection and site bleeding       Paresthesias: No.       Bolus given via needle..        Secured via.        Insertion/Infusion Method: Single Shot       Complications: none    Medication(s) Administered   Bupivacaine 0.5% PF (Infiltration) - Perineural   15 mL - 6/22/2022 9:32:00 AM  bupivacaine liposome (EXPAREL) 1.3 % LA inj susp 10 mL - Infiltration   10 mL - 6/22/2022 9:32:00 AM  Medication Administration Time: 6/22/2022 9:32 AM

## 2022-06-22 NOTE — PHARMACY-ADMISSION MEDICATION HISTORY
Pharmacy Note - Admission Medication History    Pertinent Provider Information:    ______________________________________________________________________    Prior To Admission (PTA) med list completed and updated in EMR.       PTA Med List   Medication Sig Last Dose     atorvastatin (LIPITOR) 20 MG tablet [ATORVASTATIN (LIPITOR) 20 MG TABLET] Take 10 mg by mouth bedtime. Takes 1/2 of 20 mg tab 6/21/2022 at Unknown time     cholecalciferol, vitamin D3, 1,000 unit tablet Take 2,000 Units by mouth every evening 6/21/2022 at Unknown time     co-enzyme Q-10 100 MG CAPS capsule Take 100 mg by mouth daily 6/15/2022     fish oil-omega-3 fatty acids 500 MG capsule Take 500 mg by mouth 2 times daily 6/15/2022     FLUoxetine 20 MG tablet Take 20 mg by mouth daily 6/22/2022 at Unknown time     glucosamine-chondroitin 500-400 MG CAPS per capsule Take 1 capsule by mouth daily 6/15/2022     lisinopril (PRINIVIL,ZESTRIL) 10 MG tablet Take 5 mg by mouth At Bedtime 6/21/2022 at Unknown time     loratadine (CLARITIN) 10 MG tablet Take 10 mg by mouth daily 6/22/2022 at Unknown time     Multiple Vitamins-Minerals (CENTRUM SILVER PO) Take 1 tablet by mouth daily 6/15/2022     selenium 50 MCG TABS tablet Take 50 mcg by mouth daily 6/15/2022     sodium chloride (OCEAN) 0.65 % nasal spray Spray 1-2 sprays into both nostrils daily as needed for congestion      triamterene-HCTZ (MAXZIDE-25) 37.5-25 MG tablet Take 1 tablet by mouth daily 6/21/2022 at Unknown time     Turmeric (QC TUMERIC COMPLEX PO) Take 1 tablet by mouth daily 6/15/2022       Information source(s): Patient, Clinic records and [facility]    Method of interview communication: in-person    Patient was asked about OTC/herbal products specifically.  PTA med list reflects this.    Based on the pharmacist's assessment, the PTA med list information appears reliable    Allergies were reviewed, assessed, and updated with the patient.      Patient does not anticipate  needing any multi-use medications during admission.     Thank you for the opportunity to participate in the care of this patient.      Rosie Allen FABIAN     6/22/2022     9:15 AM

## 2022-06-22 NOTE — INTERVAL H&P NOTE
"I have reviewed the surgical (or preoperative) H&P that is linked to this encounter, and examined the patient. There are no significant changes    Clinical Conditions Present on Arrival:  Clinically Significant Risk Factors Present on Admission                   # Obesity: Estimated body mass index is 34.47 kg/m  as calculated from the following:    Height as of this encounter: 1.626 m (5' 4\").    Weight as of this encounter: 91.1 kg (200 lb 12.8 oz).       "

## 2022-06-22 NOTE — CONSULTS
Hospitalist Consultation Note  Reason for consult: htn  Attending service: ortho    HPI:  71 yo f admitted for rt reverse total shoulder.     Has med hx of htn, PONV, postop hypotension.     Home meds include lipitor, fluoxetine, lisinopril, claritin, triam/hctz.     Creat on day of surgery 0.74, K of 3.7. CBC wnl.     Most recent vitals wnl.     Postop denies chest pain, dyspnea, abdominal pain, nausea. After hip surgery in the past she had issues with diarrhea and hypotension.     ROS:   Positive as stated above. Otherwise complete 10 point review of systems is negative.     Assessment:  Essential htn, hx of postop hypotension, symptoms of hypotension at home  Hyperlipidemia  Anxiety/depression  S/p Rt total shoulder    Plan:   Postop pain control and VTEproph per ortho  Continue lisinopril but give tomorrow morning  Hold triam/hctz for now pending labs in am  Check bmp in am    Physical Exam:  Constitutional: no acute distress, comfortable  Eyes: no scleral icterus  ENT: moist oral mucosa  Resp: breathing comfortably in room air at rest  CV: regular rate and rhythm, no lower ext pitting edema  MSK: rt arm in a sling  Derm: warm, dry, not pale, no jaundice  Psych: appropriate affect    Allergies   Allergen Reactions     Oxycodone Shortness Of Breath     Past Surgical History:   Procedure Laterality Date     JOINT REPLACEMENT Right 09/25/2014    TAMMY     TOTAL HIP ARTHROPLASTY Right 9/25/2014    Procedure: RIGHT TOTAL HIP ARTHROPLASTY;  Surgeon: Taco Perez MD;  Location: [facility];  Service:      TOTAL HIP ARTHROPLASTY Left 12/18/2014    Procedure: HIP TOTAL ARTHROPLASTY LEFT;  Surgeon: Taco Perez MD;  Location: [facility];  Service:      History reviewed. No pertinent family history.  Social History     Socioeconomic History     Marital status:      Spouse name: None     Number of children: None     Years of education: None     Highest education level: None    Tobacco Use     Smoking status: Never Smoker     Smokeless tobacco: Never Used   Vaping Use     Vaping Use: Never used   Substance and Sexual Activity     Alcohol use: Yes     Comment: glass a month     Drug use: No     Medications Prior to Admission   Medication Sig Dispense Refill Last Dose     atorvastatin (LIPITOR) 20 MG tablet [ATORVASTATIN (LIPITOR) 20 MG TABLET] Take 10 mg by mouth bedtime. Takes 1/2 of 20 mg tab   6/21/2022 at Unknown time     cholecalciferol, vitamin D3, 1,000 unit tablet Take 2,000 Units by mouth every evening   6/21/2022 at Unknown time     co-enzyme Q-10 100 MG CAPS capsule Take 100 mg by mouth daily   6/15/2022     fish oil-omega-3 fatty acids 500 MG capsule Take 500 mg by mouth 2 times daily   6/15/2022     FLUoxetine 20 MG tablet Take 20 mg by mouth daily   6/22/2022 at Unknown time     glucosamine-chondroitin 500-400 MG CAPS per capsule Take 1 capsule by mouth daily   6/15/2022     lisinopril (PRINIVIL,ZESTRIL) 10 MG tablet Take 5 mg by mouth At Bedtime   6/21/2022 at Unknown time     loratadine (CLARITIN) 10 MG tablet Take 10 mg by mouth daily   6/22/2022 at Unknown time     Multiple Vitamins-Minerals (CENTRUM SILVER PO) Take 1 tablet by mouth daily   6/15/2022     selenium 50 MCG TABS tablet Take 50 mcg by mouth daily   6/15/2022     sodium chloride (OCEAN) 0.65 % nasal spray Spray 1-2 sprays into both nostrils daily as needed for congestion        triamterene-HCTZ (MAXZIDE-25) 37.5-25 MG tablet Take 1 tablet by mouth daily   6/21/2022 at Unknown time     Turmeric (QC TUMERIC COMPLEX PO) Take 1 tablet by mouth daily   6/15/2022       Trung Abdi, [facility]  [facility]  Pager #: [phone]

## 2022-06-22 NOTE — ANESTHESIA PREPROCEDURE EVALUATION
Anesthesia Pre-Procedure Evaluation    Patient: Fatimah Winkler   MRN: [id number] : 1950        Procedure : Procedure(s):  RIGHT REVERSE TOTAL SHOULDER ARTHROPLASTY  POSSIBLE OPEN ROTATOR CUFF REPAIR          Past Medical History:   Diagnosis Date     Complication of anesthesia     symptomatic low BP for days after surgery     Hyperlipidemia      Hypertension      PONV (postoperative nausea and vomiting)       Past Surgical History:   Procedure Laterality Date     JOINT REPLACEMENT Right 2014    TAMMY     TOTAL HIP ARTHROPLASTY Right 2014    Procedure: RIGHT TOTAL HIP ARTHROPLASTY;  Surgeon: Taco Perez MD;  Location: [facility];  Service:      TOTAL HIP ARTHROPLASTY Left 2014    Procedure: HIP TOTAL ARTHROPLASTY LEFT;  Surgeon: Taco Perez MD;  Location: [facility];  Service:       Allergies   Allergen Reactions     Oxycodone Shortness Of Breath      Social History     Tobacco Use     Smoking status: Never Smoker     Smokeless tobacco: Never Used   Substance Use Topics     Alcohol use: Yes     Comment: glass a month      Wt Readings from Last 1 Encounters:   22 91.1 kg (200 lb 12.8 oz)        Anesthesia Evaluation   Pt has had prior anesthetic.     History of anesthetic complications  - PONV.      ROS/MED HX  ENT/Pulmonary:  - neg pulmonary ROS     Neurologic:  - neg neurologic ROS     Cardiovascular:     (+) Dyslipidemia hypertension-----    METS/Exercise Tolerance:     Hematologic:  - neg hematologic  ROS     Musculoskeletal:  - neg musculoskeletal ROS     GI/Hepatic:  - neg GI/hepatic ROS     Renal/Genitourinary:  - neg Renal ROS     Endo:     (+) Obesity,     Psychiatric/Substance Use:  - neg psychiatric ROS     Infectious Disease:  - neg infectious disease ROS     Malignancy:  - neg malignancy ROS     Other:  - neg other ROS          Physical Exam    Airway  airway exam normal      Mallampati: II   TM distance: > 3 FB   Neck ROM: full    Mouth opening: > 3 cm    Respiratory Devices and Support         Dental  no notable dental history     (+) lower retainer      Cardiovascular   cardiovascular exam normal       Rhythm and rate: regular and normal     Pulmonary   pulmonary exam normal        breath sounds clear to auscultation           OUTSIDE LABS:  CBC:   Lab Results   Component Value Date    WBC 5.4 06/22/2022    HGB 13.6 06/22/2022    HCT 42.3 06/22/2022     06/22/2022     BMP:   Lab Results   Component Value Date     05/23/2022     11/24/2021    POTASSIUM 3.7 06/22/2022    POTASSIUM 3.6 05/23/2022    CHLORIDE 101 05/23/2022    CHLORIDE 102 11/24/2021    CO2 29 05/23/2022    CO2 26 11/24/2021    BUN 18 05/23/2022    BUN 17 11/24/2021    CR 0.74 06/22/2022    CR 0.72 05/23/2022    GLC 63 (L) 05/23/2022    GLC 99 11/24/2021     COAGS:   Lab Results   Component Value Date    PTT 31 06/22/2022    INR 1.02 06/22/2022     POC: No results found for: BGM, HCG, HCGS  HEPATIC: No results found for: ALBUMIN, PROTTOTAL, ALT, AST, GGT, ALKPHOS, BILITOTAL, BILIDIRECT, ANGELA  OTHER:   Lab Results   Component Value Date    DESTINY 9.7 05/23/2022    MAG 2.0 11/24/2021       Anesthesia Plan    ASA Status:  2   NPO Status:  NPO Appropriate    Anesthesia Type: General.     - Airway: ETT   Induction: Intravenous.   Maintenance: Balanced.        Consents    Anesthesia Plan(s) and associated risks, benefits, and realistic alternatives discussed. Questions answered and patient/representative(s) expressed understanding.    - Discussed:     - Discussed with:  Patient      - Extended Intubation/Ventilatory Support Discussed: Yes.      - Patient is DNR/DNI Status: No    Use of blood products discussed: Yes.     - Discussed with: Patient.     Postoperative Care    Pain management: Peripheral nerve block (Single Shot).   PONV prophylaxis: Ondansetron (or other 5HT-3), Dexamethasone or Solumedrol, Background Propofol Infusion     Comments:    Other Comments:  IS block for POA per surgeon request.            Jordon Sanchez MD

## 2022-06-22 NOTE — ANESTHESIA PROCEDURE NOTES
Airway       Patient location during procedure: OR       Procedure Start/Stop Times: 6/22/2022 11:47 AM  Staff -        Performed By: CRNA  Consent for Airway        Urgency: elective  Indications and Patient Condition       Indications for airway management: lexi-procedural and airway protection         Mask difficulty assessment: 1 - vent by mask    Final Airway Details       Final airway type: endotracheal airway       Successful airway: ETT - single  Endotracheal Airway Details        ETT size (mm): 7.0       Cuffed: yes       Successful intubation technique: direct laryngoscopy       Grade View of Cords: 1       Adjucts: stylet       Position: Left       Measured from: gums/teeth       Secured at (cm): 21       Bite block used: Soft    Post intubation assessment        Placement verified by: capnometry, equal breath sounds and chest rise        Number of attempts at approach: 1       Number of other approaches attempted: 0       Secured with: silk tape       Ease of procedure: easy       Dentition: Intact    Medication(s) Administered   Medication Administration Time: 6/22/2022 11:47 AM

## 2022-06-23 ENCOUNTER — APPOINTMENT (OUTPATIENT)
Dept: OCCUPATIONAL THERAPY | Facility: CLINIC | Age: 72
End: 2022-06-23
Attending: ORTHOPAEDIC SURGERY
Payer: MEDICARE

## 2022-06-23 ENCOUNTER — DOCUMENTATION ONLY (OUTPATIENT)
Dept: OTHER | Facility: CLINIC | Age: 72
End: 2022-06-23

## 2022-06-23 VITALS
OXYGEN SATURATION: 96 % | DIASTOLIC BLOOD PRESSURE: 74 MMHG | HEART RATE: 52 BPM | BODY MASS INDEX: 34.28 KG/M2 | HEIGHT: 64 IN | TEMPERATURE: 97.5 F | WEIGHT: 200.8 LBS | SYSTOLIC BLOOD PRESSURE: 144 MMHG | RESPIRATION RATE: 18 BRPM

## 2022-06-23 LAB
ANION GAP SERPL CALCULATED.3IONS-SCNC: 5 MMOL/L (ref 5–18)
BUN SERPL-MCNC: 17 MG/DL (ref 8–28)
CALCIUM SERPL-MCNC: 8.6 MG/DL (ref 8.5–10.5)
CHLORIDE BLD-SCNC: 106 MMOL/L (ref 98–107)
CO2 SERPL-SCNC: 27 MMOL/L (ref 22–31)
CREAT SERPL-MCNC: 0.72 MG/DL (ref 0.6–1.1)
GFR SERPL CREATININE-BSD FRML MDRD: 88 ML/MIN/1.73M2
GLUCOSE BLD-MCNC: 142 MG/DL (ref 70–125)
HGB BLD-MCNC: 11.4 G/DL (ref 11.7–15.7)
POTASSIUM BLD-SCNC: 3.9 MMOL/L (ref 3.5–5)
SODIUM SERPL-SCNC: 138 MMOL/L (ref 136–145)

## 2022-06-23 PROCEDURE — 99213 OFFICE O/P EST LOW 20 MIN: CPT | Performed by: HOSPITALIST

## 2022-06-23 PROCEDURE — 80048 BASIC METABOLIC PNL TOTAL CA: CPT | Performed by: HOSPITALIST

## 2022-06-23 PROCEDURE — 97166 OT EVAL MOD COMPLEX 45 MIN: CPT | Mod: GO

## 2022-06-23 PROCEDURE — 97110 THERAPEUTIC EXERCISES: CPT | Mod: GO

## 2022-06-23 PROCEDURE — 97535 SELF CARE MNGMENT TRAINING: CPT | Mod: GO

## 2022-06-23 PROCEDURE — 250N000011 HC RX IP 250 OP 636: Performed by: PHYSICIAN ASSISTANT

## 2022-06-23 PROCEDURE — 250N000013 HC RX MED GY IP 250 OP 250 PS 637: Performed by: PHYSICIAN ASSISTANT

## 2022-06-23 PROCEDURE — 36415 COLL VENOUS BLD VENIPUNCTURE: CPT | Performed by: HOSPITALIST

## 2022-06-23 PROCEDURE — 85018 HEMOGLOBIN: CPT | Performed by: PHYSICIAN ASSISTANT

## 2022-06-23 PROCEDURE — 250N000013 HC RX MED GY IP 250 OP 250 PS 637: Performed by: HOSPITALIST

## 2022-06-23 RX ORDER — LISINOPRIL 10 MG/1
5 TABLET ORAL DAILY
Status: ON HOLD
Start: 2022-06-23 | End: 2024-04-24

## 2022-06-23 RX ORDER — ACETAMINOPHEN 325 MG/1
325 TABLET ORAL EVERY 4 HOURS PRN
Status: ON HOLD | COMMUNITY
Start: 2022-06-23 | End: 2024-04-24

## 2022-06-23 RX ORDER — AMOXICILLIN 250 MG
1 CAPSULE ORAL 2 TIMES DAILY
COMMUNITY
Start: 2022-06-23 | End: 2024-04-22

## 2022-06-23 RX ADMIN — LISINOPRIL 5 MG: 5 TABLET ORAL at 08:06

## 2022-06-23 RX ADMIN — LORATADINE 10 MG: 10 TABLET ORAL at 08:10

## 2022-06-23 RX ADMIN — ACETAMINOPHEN 975 MG: 325 TABLET ORAL at 00:15

## 2022-06-23 RX ADMIN — POLYETHYLENE GLYCOL 3350 17 G: 17 POWDER, FOR SOLUTION ORAL at 08:07

## 2022-06-23 RX ADMIN — ACETAMINOPHEN 975 MG: 325 TABLET ORAL at 08:06

## 2022-06-23 RX ADMIN — FLUOXETINE 20 MG: 20 CAPSULE ORAL at 08:06

## 2022-06-23 RX ADMIN — SENNOSIDES AND DOCUSATE SODIUM 1 TABLET: 50; 8.6 TABLET ORAL at 08:06

## 2022-06-23 RX ADMIN — TRAMADOL HYDROCHLORIDE 50 MG: 50 TABLET, COATED ORAL at 00:16

## 2022-06-23 RX ADMIN — CEFAZOLIN SODIUM 2 G: 2 INJECTION, SOLUTION INTRAVENOUS at 04:55

## 2022-06-23 RX ADMIN — TRAMADOL HYDROCHLORIDE 50 MG: 50 TABLET, COATED ORAL at 06:07

## 2022-06-23 RX ADMIN — HYDROMORPHONE HYDROCHLORIDE 0.4 MG: 0.2 INJECTION, SOLUTION INTRAMUSCULAR; INTRAVENOUS; SUBCUTANEOUS at 02:11

## 2022-06-23 RX ADMIN — ASPIRIN 325 MG: 325 TABLET ORAL at 08:06

## 2022-06-23 NOTE — CONSULTS
"ACUPUNCTURIST TREATMENT NOTE    Name: Fatimah Winkler  :  1950  MRN:  [id number]    Acupuncture Treatment  Patient Type: Orthopedic  Intervention Reason: Desires Experience  Patient complaint:: Stiff neck  Initial insertions: Yin schwab, Gb 20, (L) Gb 21, Temporalis motor point, (R) scalp elias x1     \"Risks and benefits of acupuncture were discussed with patient. Consent for treatment was given. We thank you for the referral.\"     Tye Allen    Date:  2022  Time:  11:53 AM    "

## 2022-06-23 NOTE — PLAN OF CARE
Occupational Therapy Discharge Summary    Reason for therapy discharge:    All goals and outcomes met, no further needs identified.    Progress towards therapy goal(s). See goals on Care Plan in [facility] electronic health record for goal details.  Goals met    Therapy recommendation(s):    No further therapy is recommended.

## 2022-06-23 NOTE — DISCHARGE INSTRUCTIONS
Lj Kraus MD    Attending    Since 6/22/2022    [phone]   To be seen in 2 weeks     Cinthia Brown MD    General - Family Medicine    [phone]   It is recommended to follow up with your primary care provider in 7 days

## 2022-06-23 NOTE — DISCHARGE SUMMARY
"ORTHOPEDIC DISCHARGE SUMMARY       Fatimah Winkler,  1950, MRN [id number]    Admission Date: 2022      Admission Diagnoses: Right shoulder pain [M25.511]  Status post shoulder surgery [Z98.890]     Discharge Date:  2022    Post-operative Day:  1 Day Post-Op    Reason for Admission: The patient was admitted for the following: Procedure(s):  RIGHT REVERSE TOTAL SHOULDER ARTHROPLASTY    BRIEF HOSPITAL COURSE   Fatimah Winkler is a pleasant 72 year old female who underwent the aforementioned procedure with Dr. Kraus on 2022. There were no intraoperative complications and the patient was transferred to the recovery room and later the orthopedic unit in stable condition. Once the patient reached the orthopedic floor our orthopedic pain protocol was implemented along with the following:    Anticoagulation Medications: ASA  Therapy: OT  Activity: NWB  Bracing: Slingshot Brace    Consultations during Admission: Hospitalist service for medical management.    COMPLICATIONS/SIGNIFICANT FINDINGS   None    DISCHARGE INFORMATION   Condition at discharge: Good  Discharge destination: Home  Patient was seen by myself on the date of discharge.    FOLLOW UP CARE   Follow up with orthopedics in 2 weeks or sooner should the need arise. Ortho will continue to manage pain control, post op anticoagulation and incision care.     Follow up with your PCP for management of chronic medical problems and to evaluate for post op medical complications including constipation, nausea/vomiting, DVT/PE, anemia, changes in blood pressure, fevers/chills, urinary retention and atelectasis/pneumonia.     Subjective   Patient is doing well on POD #1. Pain is well controlled with oral medications. Ambulating. Tolerating oral intake. Voiding.    Physical Exam   BP (!) 144/74 (BP Location: Left arm)   Pulse 52   Temp 97.5  F (36.4  C) (Oral)   Resp 18   Ht 1.626 m (5' 4\")   Wt 91.1 kg (200 lb 12.8 oz)   SpO2 96%   BMI 34.47 " kg/m    The patient is A&Ox3. Appears comfortable, sitting up at bedsideWearing sling appropriately.  Sensation is intact in Right upper extremity, hand & fingers, but slightly reduced compared to left (block wearing off).  Right upper extremity motor strength 5/5 in ulnar, median and radial nerve distributions.  Palpable right radial pulse.  Calves are soft and non-tender.   The incision is covered. Dressing C/D/I.    Pertinent Results at Discharge     Hemoglobin   Date/Time Value Ref Range Status   06/23/2022 05:52 AM 11.4 (L) 11.7 - 15.7 g/dL Final   06/22/2022 08:30 AM 13.6 11.7 - 15.7 g/dL Final     INR   Date/Time Value Ref Range Status   06/22/2022 08:30 AM 1.02 0.85 - 1.15 Final     Platelet Count   Date/Time Value Ref Range Status   06/22/2022 08:30  150 - 450 10e3/uL Final       Problem List   Active Problems:    Status post shoulder surgery    Claudia Toro PA-C/Dr. Kraus  [facility]  [phone]  Date: 6/23/2022  Time: 9:56 AM

## 2022-06-23 NOTE — PLAN OF CARE
Patient vital signs are at baseline: Yes  Patient able to ambulate as they were prior to admission or with assist devices provided by therapies during their stay:  Yes  Patient MUST void prior to discharge:  Yes  Patient able to tolerate oral intake:  Yes  Pain has adequate pain control using Oral analgesics:  Yes  Does patient have an identified :  Yes  Has goal D/C date and time been discussed with patient:  Yes  Goal Outcome Evaluation:      Pt pain is managed with PRN Tramadol, IV dilaudid, and scheduled Tylenol. Up with A X 1.

## 2022-06-23 NOTE — PROGRESS NOTES
[facility] Medicine PROGRESS NOTE      Identification/Summary:   71 yo f admitted for rt reverse total shoulder.      Has med hx of htn, PONV, postop hypotension.      Home meds include lipitor, fluoxetine, lisinopril, claritin, triam/hctz.      Creat on day of surgery 0.74, K of 3.7. CBC wnl.        Assessment:  Essential htn, hx of postop hypotension, symptoms of hypotension at home, normal blood pressure here  Hyperlipidemia  Anxiety/depression  S/p Rt total shoulder     Plan:   Postop pain control and VTEproph per ortho  Changed lisinopril to morning as she notices orthostatic symptoms in the middle of night  Resume triamterene/hydrochlorothiazide  Okay to discharge medically    Interval History/Subjective:  Denies chest pain, shortness of breath, abdominal pain.  Did have a lot of pain in the shoulder and scapular region last night.  No dyspnea on exertion.  Did have a sore throat and cough yesterday.  No nausea or abdominal pain.  Did get some Zofran due to feeling nauseated earlier.    Physical Exam/Objective:  Gen: no acute distress, comfortable  ENT: no scleral icterus  Pulm: Breathing comfortably on room air at rest  CV: regular rate and rhythm, no significant pitting edema  MSK: Right arm in sling  Derm: Warm, dry, not pale, no jaundice  Psych: appropriate affect    Medications:     acetaminophen  975 mg Oral Q8H     aspirin  325 mg Oral BID w/meals     atorvastatin  10 mg Oral At Bedtime     FLUoxetine  20 mg Oral Daily     lisinopril  5 mg Oral Daily     loratadine  10 mg Oral Daily     polyethylene glycol  17 g Oral Daily     senna-docusate  1 tablet Oral BID     sodium chloride (PF)  3 mL Intracatheter Q8H       Trung Abdi DO   Hospitalist  [facility]

## 2022-06-23 NOTE — PROGRESS NOTES
Care Management Discharge Note    Discharge Date: 06/23/2022       Discharge Disposition: Home    Discharge Services:  Home with assist from family, outpatient PT at [facility]    Discharge Transportation: family or friend will provide      Education Provided on the Discharge Plan:  CM met with the patient. AVS per bedside RN.    Persons Notified of Discharge Plans: patient    Patient/Family in Agreement with the Plan: yes    Handoff Referral Completed: Yes    Additional Information:  Chart reviewed. CM met with patient. Patient lives alone but her sister will ne staying with her. Patient states she will be doing outpatient PT at [facility] in [place]. Denied additional needs from CM. Family will transpot home at time of hospital discharge.         Jesika Sanchez, RN

## 2022-06-23 NOTE — PROGRESS NOTES
Discharged to home with support person. Reviewed AVS. Stated understanding of discharge instructions.

## 2022-06-23 NOTE — PROGRESS NOTES
06/23/22 1015   Quick Adds   Type of Visit Initial Occupational Therapy Evaluation   Living Environment   People in Home alone  (sisters will be staying w/ her for 1-2 weeks)   Current Living Arrangements house   Home Accessibility stairs to enter home;stairs within home   Number of Stairs, Main Entrance 4   Transportation Anticipated family or friend will provide   Living Environment Comments ! level living except for launsry in basement. has cane and FWW. Pt has walkin shower, grab bars, RTS   Self-Care   Usual Activity Tolerance good   Current Activity Tolerance good   Fall history within last six months yes   Number of times patient has fallen within last six months 2   Activity/Exercise/Self-Care Comment Pt INd w/ ADLs and IADLs at baseline   General Information   Onset of Illness/Injury or Date of Surgery 06/22/22   Referring Physician Lj Kraus MD   Additional Occupational Profile Info/Pertinent History of Current Problem TSA   Existing Precautions/Restrictions shoulder   Left Upper Extremity (Weight-bearing Status) full weight-bearing (FWB)   Right Upper Extremity (Weight-bearing Status) non weight-bearing (NWB)   Cognitive Status Examination   Orientation Status orientation to person, place and time   Visual Perception   Visual Impairment/Limitations WNL   Sensory   Sensory Comments minor numbness in RUE   Pain Assessment   Patient Currently in Pain No   Integumentary/Edema   Integumentary/Edema no deficits were identifed   Posture   Posture not impaired   Range of Motion Comprehensive   General Range of Motion upper extremity range of motion deficits identified   Comment, General Range of Motion No AROM in RUE   Strength Comprehensive (MMT)   General Manual Muscle Testing (MMT) Assessment no strength deficits identified   Muscle Tone Assessment   Muscle Tone Quick Adds No deficits were identified   Coordination   Upper Extremity Coordination No deficits were identified   Bed Mobility   Bed Mobility  supine-sit;sit-supine   Comment (Bed Mobility) Mod I w/ bed rail   Transfers   Transfers bed-chair transfer;sit-stand transfer;toilet transfer   Transfer Comments Mod I-SBA   Activities of Daily Living   BADL Assessment/Intervention upper body dressing;lower body dressing   Upper Body Dressing Assessment/Training   [place] Level (Upper Body Dressing) minimum assist (75% patient effort)   Lower Body Dressing Assessment/Training   [place] Level (Lower Body Dressing) minimum assist (75% patient effort)   Clinical Impression   Criteria for Skilled Therapeutic Interventions Met (OT) Yes, treatment indicated   OT Diagnosis decreased ADLs   Influenced by the following impairments TSA   OT Problem List-Impairments impacting ADL range of motion (ROM);sensation;post-surgical precautions   Assessment of Occupational Performance 3-5 Performance Deficits   Identified Performance Deficits FB dressing, bed mobility, and transfers   Planned Therapy Interventions (OT) ADL retraining;bed mobility training;transfer training;joint mobilization;ROM;home program guidelines   Clinical Decision Making Complexity (OT) moderate complexity   Risk & Benefits of therapy have been explained evaluation/treatment results reviewed;patient;sibling   OT Discharge Planning   OT Discharge Recommendation (DC Rec) (S)  home with assist   OT Rationale for DC Rec Pt close to baseline and tolerating therapy well. Great setup at home and her sisters will be switching days and staying w/ her at all times. Recommending home assist   Total Evaluation Time (Minutes)   Total Evaluation Time (Minutes) 10   OT Goals   Therapy Frequency (OT) One time eval and treatment   OT Predicted Duration/Target Date for Goal Attainment 06/23/22   OT Goals Lower Body Dressing;Upper Body Dressing;Bed Mobility;Toilet Transfer/Toileting   OT: Upper Body Dressing Minimal assist;within precautions;Goal Met;Completed   OT: Lower Body Dressing Modified independent;within  precautions;Goal Met;Completed   OT: Bed Mobility Modified independent;supine to/from sitting;rolling;bridging;within precautions;Goal Met;Completed   OT: Toilet Transfer/Toileting Modified independent;toilet transfer;cleaning and garment management;within precautions;Goal Met;Completed

## 2022-06-23 NOTE — PROGRESS NOTES
"Orthopedic Progress Note      Assessment: 1 Day Post-Op  S/P Procedure(s):  RIGHT REVERSE TOTAL SHOULDER ARTHROPLASTY     Plan:   - Continue PT/OT.  - Weightbearing status: NWB Right Upper Extremity  - Continue sling & abduction pillow  - Anticoagulation: Aspirin 81 mg bid x30 days, in addition to SCDs, yobani stockings and early ambulation.  - Appreciate hospitalist skye BARONE.  - Discharge planning: Discharge to home today pending OT eval.    Subjective:  Pain: Right shoulder pain currently controlled on norco, tylenol, aspirin  Nausea, Vomiting:  No  Chest pain: No  Lightheadedness, Dizziness:  No  Neuro: Block wearing off. Moving fingers, fingers tingling.    Patient had rough night with pain in her right shoulder blade and below right clavicle. Standing up and walking improved the pain. IV dilaudid resolved the pain and she was able to sleep for 4 hours afterward. This am, pain has not returned. She has not worked with OT yet. Otherwise, tolerating oral intake, voiding & ambulating. Hgb 11.4 (pre-op 13.6).    Objective:  BP (!) 144/74 (BP Location: Left arm)   Pulse 52   Temp 97.5  F (36.4  C) (Oral)   Resp 18   Ht 1.626 m (5' 4\")   Wt 91.1 kg (200 lb 12.8 oz)   SpO2 96%   BMI 34.47 kg/m    The patient is A&Ox3. Appears comfortable, sitting up at bedsideWearing sling appropriately.  Sensation is intact in Right upper extremity, hand & fingers, but slightly reduced compared to left (block wearing off).  Right upper extremity motor strength 5/5 in ulnar, median and radial nerve distributions.  Palpable right radial pulse.  Calves are soft and non-tender.   The incision is covered. Dressing C/D/I.    Pertinent Labs   Lab Results: personally reviewed.   Lab Results   Component Value Date    INR 1.02 06/22/2022     Lab Results   Component Value Date    WBC 5.4 06/22/2022    HGB 11.4 (L) 06/23/2022    HCT 42.3 06/22/2022    MCV 96 06/22/2022     06/22/2022     Lab Results   Component Value Date    NA " 138 06/23/2022    CO2 27 06/23/2022     Report completed by:  Claudia Toro PA-C  [facility]    Date: 6/23/2022  Time: 9:53 AM

## 2022-06-23 NOTE — PROGRESS NOTES
Patient vital signs are at baseline: Yes  Patient able to ambulate as they were prior to admission or with assist devices provided by therapies during their stay:  Yes  Patient MUST void prior to discharge:  Yes  Patient able to tolerate oral intake:  Yes  Pain has adequate pain control using Oral analgesics:  Yes  Does patient have an identified :  Yes  Has goal D/C date and time been discussed with patient:  Yes     Patient is clear to discharge. Sisters of patient at bedside to transport patient home and will stay with patient. States her pain is tolerable and feels comfortable discharging home. Ice packs were given to patient. IV removed. AVS discussed. Denies concerns at the time of discharge.

## 2022-07-14 ENCOUNTER — LAB REQUISITION (OUTPATIENT)
Dept: LAB | Facility: CLINIC | Age: 72
End: 2022-07-14
Payer: MEDICARE

## 2022-07-14 DIAGNOSIS — E55.9 VITAMIN D DEFICIENCY, UNSPECIFIED: ICD-10-CM

## 2022-07-14 DIAGNOSIS — E78.2 MIXED HYPERLIPIDEMIA: ICD-10-CM

## 2022-07-14 LAB
CHOLEST SERPL-MCNC: 195 MG/DL
HDLC SERPL-MCNC: 59 MG/DL
LDLC SERPL CALC-MCNC: 118 MG/DL
NONHDLC SERPL-MCNC: 136 MG/DL
TRIGL SERPL-MCNC: 91 MG/DL

## 2022-07-14 PROCEDURE — 82306 VITAMIN D 25 HYDROXY: CPT | Mod: ORL | Performed by: FAMILY MEDICINE

## 2022-07-14 PROCEDURE — 80061 LIPID PANEL: CPT | Mod: ORL | Performed by: FAMILY MEDICINE

## 2022-07-15 LAB — DEPRECATED CALCIDIOL+CALCIFEROL SERPL-MC: 56 UG/L (ref 20–75)

## 2022-07-23 ENCOUNTER — HEALTH MAINTENANCE LETTER (OUTPATIENT)
Age: 72
End: 2022-07-23

## 2022-10-01 ENCOUNTER — HEALTH MAINTENANCE LETTER (OUTPATIENT)
Age: 72
End: 2022-10-01

## 2023-01-16 ENCOUNTER — LAB REQUISITION (OUTPATIENT)
Dept: LAB | Facility: CLINIC | Age: 73
End: 2023-01-16
Payer: MEDICARE

## 2023-01-16 DIAGNOSIS — I10 ESSENTIAL (PRIMARY) HYPERTENSION: ICD-10-CM

## 2023-01-16 DIAGNOSIS — E78.2 MIXED HYPERLIPIDEMIA: ICD-10-CM

## 2023-01-16 LAB
ANION GAP SERPL CALCULATED.3IONS-SCNC: 17 MMOL/L (ref 7–15)
BUN SERPL-MCNC: 18.9 MG/DL (ref 8–23)
CALCIUM SERPL-MCNC: 9.5 MG/DL (ref 8.8–10.2)
CHLORIDE SERPL-SCNC: 103 MMOL/L (ref 98–107)
CHOLEST SERPL-MCNC: 201 MG/DL
CREAT SERPL-MCNC: 0.8 MG/DL (ref 0.51–0.95)
DEPRECATED HCO3 PLAS-SCNC: 21 MMOL/L (ref 22–29)
GFR SERPL CREATININE-BSD FRML MDRD: 78 ML/MIN/1.73M2
GLUCOSE SERPL-MCNC: 124 MG/DL (ref 70–99)
HDLC SERPL-MCNC: 60 MG/DL
LDLC SERPL CALC-MCNC: 124 MG/DL
MAGNESIUM SERPL-MCNC: 2.1 MG/DL (ref 1.7–2.3)
NONHDLC SERPL-MCNC: 141 MG/DL
POTASSIUM SERPL-SCNC: 4.1 MMOL/L (ref 3.4–5.3)
SODIUM SERPL-SCNC: 141 MMOL/L (ref 136–145)
TRIGL SERPL-MCNC: 83 MG/DL

## 2023-01-16 PROCEDURE — 83735 ASSAY OF MAGNESIUM: CPT | Mod: ORL | Performed by: FAMILY MEDICINE

## 2023-01-16 PROCEDURE — 80061 LIPID PANEL: CPT | Mod: ORL | Performed by: FAMILY MEDICINE

## 2023-01-16 PROCEDURE — 80048 BASIC METABOLIC PNL TOTAL CA: CPT | Mod: ORL | Performed by: FAMILY MEDICINE

## 2023-09-11 ENCOUNTER — LAB REQUISITION (OUTPATIENT)
Dept: LAB | Facility: CLINIC | Age: 73
End: 2023-09-11
Payer: MEDICARE

## 2023-09-11 DIAGNOSIS — E55.9 VITAMIN D DEFICIENCY, UNSPECIFIED: ICD-10-CM

## 2023-09-11 DIAGNOSIS — I10 ESSENTIAL (PRIMARY) HYPERTENSION: ICD-10-CM

## 2023-09-11 DIAGNOSIS — E78.2 MIXED HYPERLIPIDEMIA: ICD-10-CM

## 2023-09-11 LAB
ANION GAP SERPL CALCULATED.3IONS-SCNC: 19 MMOL/L (ref 7–15)
BUN SERPL-MCNC: 18.1 MG/DL (ref 8–23)
CALCIUM SERPL-MCNC: 9.6 MG/DL (ref 8.8–10.2)
CHLORIDE SERPL-SCNC: 101 MMOL/L (ref 98–107)
CHOLEST SERPL-MCNC: 175 MG/DL
CREAT SERPL-MCNC: 0.86 MG/DL (ref 0.51–0.95)
DEPRECATED HCO3 PLAS-SCNC: 19 MMOL/L (ref 22–29)
EGFRCR SERPLBLD CKD-EPI 2021: 71 ML/MIN/1.73M2
GLUCOSE SERPL-MCNC: 111 MG/DL (ref 70–99)
HDLC SERPL-MCNC: 60 MG/DL
LDLC SERPL CALC-MCNC: 101 MG/DL
NONHDLC SERPL-MCNC: 115 MG/DL
POTASSIUM SERPL-SCNC: 4.3 MMOL/L (ref 3.4–5.3)
SODIUM SERPL-SCNC: 139 MMOL/L (ref 136–145)
TRIGL SERPL-MCNC: 69 MG/DL

## 2023-09-11 PROCEDURE — 80061 LIPID PANEL: CPT | Mod: ORL | Performed by: FAMILY MEDICINE

## 2023-09-11 PROCEDURE — 80048 BASIC METABOLIC PNL TOTAL CA: CPT | Mod: ORL | Performed by: FAMILY MEDICINE

## 2023-09-11 PROCEDURE — 82306 VITAMIN D 25 HYDROXY: CPT | Mod: ORL | Performed by: FAMILY MEDICINE

## 2023-09-12 LAB — DEPRECATED CALCIDIOL+CALCIFEROL SERPL-MC: 67 UG/L (ref 20–75)

## 2024-03-14 ENCOUNTER — DOCUMENTATION ONLY (OUTPATIENT)
Dept: OTHER | Facility: CLINIC | Age: 74
End: 2024-03-14
Payer: MEDICARE

## 2024-04-02 ENCOUNTER — LAB REQUISITION (OUTPATIENT)
Dept: LAB | Facility: CLINIC | Age: 74
End: 2024-04-02
Payer: MEDICARE

## 2024-04-02 DIAGNOSIS — I10 ESSENTIAL (PRIMARY) HYPERTENSION: ICD-10-CM

## 2024-04-02 PROCEDURE — 80048 BASIC METABOLIC PNL TOTAL CA: CPT | Mod: ORL | Performed by: FAMILY MEDICINE

## 2024-04-03 LAB
ANION GAP SERPL CALCULATED.3IONS-SCNC: 12 MMOL/L (ref 7–15)
BUN SERPL-MCNC: 17.3 MG/DL (ref 8–23)
CALCIUM SERPL-MCNC: 9.4 MG/DL (ref 8.8–10.2)
CHLORIDE SERPL-SCNC: 99 MMOL/L (ref 98–107)
CREAT SERPL-MCNC: 0.85 MG/DL (ref 0.51–0.95)
DEPRECATED HCO3 PLAS-SCNC: 25 MMOL/L (ref 22–29)
EGFRCR SERPLBLD CKD-EPI 2021: 72 ML/MIN/1.73M2
GLUCOSE SERPL-MCNC: 89 MG/DL (ref 70–99)
POTASSIUM SERPL-SCNC: 4.6 MMOL/L (ref 3.4–5.3)
SODIUM SERPL-SCNC: 136 MMOL/L (ref 135–145)

## 2024-04-22 RX ORDER — AMOXICILLIN 500 MG/1
500 CAPSULE ORAL ONCE
Status: ON HOLD | COMMUNITY
Start: 2023-10-17 | End: 2024-04-24

## 2024-04-24 ENCOUNTER — HOSPITAL ENCOUNTER (OUTPATIENT)
Facility: CLINIC | Age: 74
Discharge: HOME | End: 2024-04-25
Attending: ORTHOPAEDIC SURGERY | Admitting: ORTHOPAEDIC SURGERY
Payer: MEDICARE

## 2024-04-24 ENCOUNTER — ANESTHESIA (OUTPATIENT)
Dept: SURGERY | Facility: CLINIC | Age: 74
End: 2024-04-24
Payer: MEDICARE

## 2024-04-24 ENCOUNTER — APPOINTMENT (OUTPATIENT)
Dept: RADIOLOGY | Facility: CLINIC | Age: 74
End: 2024-04-24
Attending: PHYSICIAN ASSISTANT
Payer: MEDICARE

## 2024-04-24 ENCOUNTER — ANESTHESIA EVENT (OUTPATIENT)
Dept: SURGERY | Facility: CLINIC | Age: 74
End: 2024-04-24
Payer: MEDICARE

## 2024-04-24 DIAGNOSIS — Z96.612 S/P REVERSE TOTAL SHOULDER ARTHROPLASTY, LEFT: Primary | ICD-10-CM

## 2024-04-24 PROBLEM — Z98.890 STATUS POST SHOULDER SURGERY: Status: ACTIVE | Noted: 2022-06-22

## 2024-04-24 LAB
ABO/RH(D): NORMAL
ANTIBODY SCREEN: NEGATIVE
APTT PPP: 31 SECONDS (ref 22–38)
CREAT SERPL-MCNC: 0.8 MG/DL (ref 0.51–0.95)
EGFRCR SERPLBLD CKD-EPI 2021: 77 ML/MIN/1.73M2
ERYTHROCYTE [DISTWIDTH] IN BLOOD BY AUTOMATED COUNT: 12.1 % (ref 10–15)
HCT VFR BLD AUTO: 39.7 % (ref 35–47)
HGB BLD-MCNC: 13.9 G/DL (ref 11.7–15.7)
INR PPP: 0.94 (ref 0.85–1.15)
MCH RBC QN AUTO: 32.2 PG (ref 26.5–33)
MCHC RBC AUTO-ENTMCNC: 35 G/DL (ref 31.5–36.5)
MCV RBC AUTO: 92 FL (ref 78–100)
PLATELET # BLD AUTO: 195 10E3/UL (ref 150–450)
POTASSIUM SERPL-SCNC: 3.9 MMOL/L (ref 3.4–5.3)
RBC # BLD AUTO: 4.32 10E6/UL (ref 3.8–5.2)
SPECIMEN EXPIRATION DATE: NORMAL
WBC # BLD AUTO: 5.9 10E3/UL (ref 4–11)

## 2024-04-24 PROCEDURE — 250N000009 HC RX 250: Performed by: ORTHOPAEDIC SURGERY

## 2024-04-24 PROCEDURE — 258N000003 HC RX IP 258 OP 636: Performed by: ANESTHESIOLOGY

## 2024-04-24 PROCEDURE — 250N000013 HC RX MED GY IP 250 OP 250 PS 637: Performed by: PHYSICIAN ASSISTANT

## 2024-04-24 PROCEDURE — C1713 ANCHOR/SCREW BN/BN,TIS/BN: HCPCS | Performed by: ORTHOPAEDIC SURGERY

## 2024-04-24 PROCEDURE — 250N000011 HC RX IP 250 OP 636: Performed by: ORTHOPAEDIC SURGERY

## 2024-04-24 PROCEDURE — 710N000010 HC RECOVERY PHASE 1, LEVEL 2, PER MIN: Performed by: ORTHOPAEDIC SURGERY

## 2024-04-24 PROCEDURE — 999N000065 XR SHOULDER LEFT PORT G/E 2 VIEWS: Mod: LT

## 2024-04-24 PROCEDURE — C1776 JOINT DEVICE (IMPLANTABLE): HCPCS | Performed by: ORTHOPAEDIC SURGERY

## 2024-04-24 PROCEDURE — 360N000077 HC SURGERY LEVEL 4, PER MIN: Performed by: ORTHOPAEDIC SURGERY

## 2024-04-24 PROCEDURE — 999N000141 HC STATISTIC PRE-PROCEDURE NURSING ASSESSMENT: Performed by: ORTHOPAEDIC SURGERY

## 2024-04-24 PROCEDURE — 272N000001 HC OR GENERAL SUPPLY STERILE: Performed by: ORTHOPAEDIC SURGERY

## 2024-04-24 PROCEDURE — 85730 THROMBOPLASTIN TIME PARTIAL: CPT | Performed by: PHYSICIAN ASSISTANT

## 2024-04-24 PROCEDURE — 250N000011 HC RX IP 250 OP 636: Performed by: PHYSICIAN ASSISTANT

## 2024-04-24 PROCEDURE — 250N000009 HC RX 250: Performed by: ANESTHESIOLOGY

## 2024-04-24 PROCEDURE — 99222 1ST HOSP IP/OBS MODERATE 55: CPT | Mod: AI | Performed by: STUDENT IN AN ORGANIZED HEALTH CARE EDUCATION/TRAINING PROGRAM

## 2024-04-24 PROCEDURE — 250N000011 HC RX IP 250 OP 636: Performed by: ANESTHESIOLOGY

## 2024-04-24 PROCEDURE — 36415 COLL VENOUS BLD VENIPUNCTURE: CPT | Performed by: PHYSICIAN ASSISTANT

## 2024-04-24 PROCEDURE — 370N000017 HC ANESTHESIA TECHNICAL FEE, PER MIN: Performed by: ORTHOPAEDIC SURGERY

## 2024-04-24 PROCEDURE — 85014 HEMATOCRIT: CPT | Performed by: PHYSICIAN ASSISTANT

## 2024-04-24 PROCEDURE — 84132 ASSAY OF SERUM POTASSIUM: CPT | Performed by: PHYSICIAN ASSISTANT

## 2024-04-24 PROCEDURE — 86900 BLOOD TYPING SEROLOGIC ABO: CPT | Performed by: PHYSICIAN ASSISTANT

## 2024-04-24 PROCEDURE — 250N000013 HC RX MED GY IP 250 OP 250 PS 637: Performed by: STUDENT IN AN ORGANIZED HEALTH CARE EDUCATION/TRAINING PROGRAM

## 2024-04-24 PROCEDURE — 82565 ASSAY OF CREATININE: CPT | Performed by: PHYSICIAN ASSISTANT

## 2024-04-24 PROCEDURE — 85610 PROTHROMBIN TIME: CPT | Performed by: PHYSICIAN ASSISTANT

## 2024-04-24 PROCEDURE — C9290 INJ, BUPIVACAINE LIPOSOME: HCPCS | Performed by: ANESTHESIOLOGY

## 2024-04-24 PROCEDURE — 250N000013 HC RX MED GY IP 250 OP 250 PS 637: Performed by: ANESTHESIOLOGY

## 2024-04-24 DEVICE — SCREW PERIPHERAL 18MM DWJ318: Type: IMPLANTABLE DEVICE | Site: SHOULDER | Status: FUNCTIONAL

## 2024-04-24 DEVICE — IMPLANTABLE DEVICE
Type: IMPLANTABLE DEVICE | Site: SHOULDER | Status: FUNCTIONAL
Brand: TORNIER FLEX SHOULDER SYSTEM

## 2024-04-24 DEVICE — SCREW PERIPHERAL 14MM DWJ314: Type: IMPLANTABLE DEVICE | Site: SHOULDER | Status: FUNCTIONAL

## 2024-04-24 DEVICE — IMPLANTABLE DEVICE
Type: IMPLANTABLE DEVICE | Site: SHOULDER | Status: FUNCTIONAL
Brand: TORNIER PERFORM™ REVERSED

## 2024-04-24 DEVICE — IMPLANTABLE DEVICE
Type: IMPLANTABLE DEVICE | Site: SHOULDER | Status: FUNCTIONAL
Brand: TORNIER PERFORM® REVERSED GLENOID

## 2024-04-24 DEVICE — SCREW PERIPHERAL 22MM: Type: IMPLANTABLE DEVICE | Site: SHOULDER | Status: FUNCTIONAL

## 2024-04-24 DEVICE — SCREW CENTRAL 6.5X30MM DWJ130: Type: IMPLANTABLE DEVICE | Site: SHOULDER | Status: FUNCTIONAL

## 2024-04-24 RX ORDER — TRAMADOL HYDROCHLORIDE 50 MG/1
50 TABLET ORAL EVERY 6 HOURS PRN
Status: DISCONTINUED | OUTPATIENT
Start: 2024-04-24 | End: 2024-04-25 | Stop reason: HOSPADM

## 2024-04-24 RX ORDER — VANCOMYCIN HYDROCHLORIDE 1 G/20ML
INJECTION, POWDER, LYOPHILIZED, FOR SOLUTION INTRAVENOUS PRN
Status: DISCONTINUED | OUTPATIENT
Start: 2024-04-24 | End: 2024-04-24 | Stop reason: HOSPADM

## 2024-04-24 RX ORDER — FAMOTIDINE 20 MG/1
20 TABLET, FILM COATED ORAL DAILY
COMMUNITY

## 2024-04-24 RX ORDER — ACETAMINOPHEN 325 MG/1
975 TABLET ORAL ONCE
Status: COMPLETED | OUTPATIENT
Start: 2024-04-24 | End: 2024-04-24

## 2024-04-24 RX ORDER — DEXAMETHASONE SODIUM PHOSPHATE 10 MG/ML
INJECTION, SOLUTION INTRAMUSCULAR; INTRAVENOUS PRN
Status: DISCONTINUED | OUTPATIENT
Start: 2024-04-24 | End: 2024-04-24

## 2024-04-24 RX ORDER — ASPIRIN 81 MG/1
81 TABLET ORAL 2 TIMES DAILY
Status: DISCONTINUED | OUTPATIENT
Start: 2024-04-24 | End: 2024-04-25 | Stop reason: HOSPADM

## 2024-04-24 RX ORDER — HYDROXYZINE HYDROCHLORIDE 10 MG/1
10 TABLET, FILM COATED ORAL EVERY 6 HOURS PRN
Status: DISCONTINUED | OUTPATIENT
Start: 2024-04-24 | End: 2024-04-25 | Stop reason: HOSPADM

## 2024-04-24 RX ORDER — TRANEXAMIC ACID 650 MG/1
1950 TABLET ORAL ONCE
Status: COMPLETED | OUTPATIENT
Start: 2024-04-24 | End: 2024-04-24

## 2024-04-24 RX ORDER — NALOXONE HYDROCHLORIDE 0.4 MG/ML
0.1 INJECTION, SOLUTION INTRAMUSCULAR; INTRAVENOUS; SUBCUTANEOUS
Status: DISCONTINUED | OUTPATIENT
Start: 2024-04-24 | End: 2024-04-24 | Stop reason: HOSPADM

## 2024-04-24 RX ORDER — DIPHENHYDRAMINE HCL 12.5 MG/5ML
12.5 SOLUTION ORAL EVERY 6 HOURS PRN
Status: DISCONTINUED | OUTPATIENT
Start: 2024-04-24 | End: 2024-04-25 | Stop reason: HOSPADM

## 2024-04-24 RX ORDER — NALOXONE HYDROCHLORIDE 0.4 MG/ML
0.1 INJECTION, SOLUTION INTRAMUSCULAR; INTRAVENOUS; SUBCUTANEOUS
Status: DISCONTINUED | OUTPATIENT
Start: 2024-04-24 | End: 2024-04-24

## 2024-04-24 RX ORDER — FENTANYL CITRATE 50 UG/ML
25-100 INJECTION, SOLUTION INTRAMUSCULAR; INTRAVENOUS
Status: DISCONTINUED | OUTPATIENT
Start: 2024-04-24 | End: 2024-04-24 | Stop reason: HOSPADM

## 2024-04-24 RX ORDER — SODIUM CHLORIDE, SODIUM LACTATE, POTASSIUM CHLORIDE, CALCIUM CHLORIDE 600; 310; 30; 20 MG/100ML; MG/100ML; MG/100ML; MG/100ML
INJECTION, SOLUTION INTRAVENOUS CONTINUOUS
Status: DISCONTINUED | OUTPATIENT
Start: 2024-04-24 | End: 2024-04-24 | Stop reason: HOSPADM

## 2024-04-24 RX ORDER — MAGNESIUM HYDROXIDE 1200 MG/15ML
LIQUID ORAL PRN
Status: DISCONTINUED | OUTPATIENT
Start: 2024-04-24 | End: 2024-04-24 | Stop reason: HOSPADM

## 2024-04-24 RX ORDER — VANCOMYCIN HYDROCHLORIDE 1 G/20ML
1 INJECTION, POWDER, LYOPHILIZED, FOR SOLUTION INTRAVENOUS ONCE
Status: DISCONTINUED | OUTPATIENT
Start: 2024-04-24 | End: 2024-04-24 | Stop reason: HOSPADM

## 2024-04-24 RX ORDER — ATORVASTATIN CALCIUM 10 MG/1
10 TABLET, FILM COATED ORAL EVERY EVENING
Status: DISCONTINUED | OUTPATIENT
Start: 2024-04-24 | End: 2024-04-25 | Stop reason: HOSPADM

## 2024-04-24 RX ORDER — NALOXONE HYDROCHLORIDE 0.4 MG/ML
0.4 INJECTION, SOLUTION INTRAMUSCULAR; INTRAVENOUS; SUBCUTANEOUS
Status: DISCONTINUED | OUTPATIENT
Start: 2024-04-24 | End: 2024-04-25 | Stop reason: HOSPADM

## 2024-04-24 RX ORDER — ACETAMINOPHEN 325 MG/1
650 TABLET ORAL EVERY 4 HOURS PRN
Status: DISCONTINUED | OUTPATIENT
Start: 2024-04-27 | End: 2024-04-25 | Stop reason: HOSPADM

## 2024-04-24 RX ORDER — FAMOTIDINE 20 MG/1
20 TABLET, FILM COATED ORAL DAILY
Status: DISCONTINUED | OUTPATIENT
Start: 2024-04-25 | End: 2024-04-25 | Stop reason: HOSPADM

## 2024-04-24 RX ORDER — IBUPROFEN 600 MG/1
600 TABLET, FILM COATED ORAL EVERY 6 HOURS PRN
Status: DISCONTINUED | OUTPATIENT
Start: 2024-04-24 | End: 2024-04-25 | Stop reason: HOSPADM

## 2024-04-24 RX ORDER — ACETAMINOPHEN 325 MG/1
975 TABLET ORAL EVERY 8 HOURS
Qty: 27 TABLET | Refills: 0 | Status: DISCONTINUED | OUTPATIENT
Start: 2024-04-24 | End: 2024-04-25 | Stop reason: HOSPADM

## 2024-04-24 RX ORDER — ASPIRIN 81 MG/1
81 TABLET ORAL 2 TIMES DAILY WITH MEALS
Qty: 60 TABLET | Refills: 0 | Status: SHIPPED | OUTPATIENT
Start: 2024-04-24

## 2024-04-24 RX ORDER — VANCOMYCIN HYDROCHLORIDE 1 G/20ML
INJECTION, POWDER, LYOPHILIZED, FOR SOLUTION INTRAVENOUS
Status: DISCONTINUED
Start: 2024-04-24 | End: 2024-04-24 | Stop reason: HOSPADM

## 2024-04-24 RX ORDER — TRIAMTERENE/HYDROCHLOROTHIAZID 37.5-25 MG
1 TABLET ORAL DAILY
Status: DISCONTINUED | OUTPATIENT
Start: 2024-04-24 | End: 2024-04-25 | Stop reason: HOSPADM

## 2024-04-24 RX ORDER — LISINOPRIL 20 MG/1
20 TABLET ORAL EVERY EVENING
COMMUNITY

## 2024-04-24 RX ORDER — PROCHLORPERAZINE MALEATE 5 MG
5 TABLET ORAL EVERY 6 HOURS PRN
Status: DISCONTINUED | OUTPATIENT
Start: 2024-04-24 | End: 2024-04-25 | Stop reason: HOSPADM

## 2024-04-24 RX ORDER — EPHEDRINE SULFATE 50 MG/ML
INJECTION, SOLUTION INTRAMUSCULAR; INTRAVENOUS; SUBCUTANEOUS PRN
Status: DISCONTINUED | OUTPATIENT
Start: 2024-04-24 | End: 2024-04-24

## 2024-04-24 RX ORDER — BUPIVACAINE HYDROCHLORIDE 5 MG/ML
INJECTION, SOLUTION EPIDURAL; INTRACAUDAL
Status: COMPLETED | OUTPATIENT
Start: 2024-04-24 | End: 2024-04-24

## 2024-04-24 RX ORDER — ONDANSETRON 2 MG/ML
INJECTION INTRAMUSCULAR; INTRAVENOUS PRN
Status: DISCONTINUED | OUTPATIENT
Start: 2024-04-24 | End: 2024-04-24

## 2024-04-24 RX ORDER — LORATADINE 10 MG/1
10 TABLET ORAL DAILY
Status: DISCONTINUED | OUTPATIENT
Start: 2024-04-25 | End: 2024-04-25 | Stop reason: HOSPADM

## 2024-04-24 RX ORDER — ONDANSETRON 4 MG/1
4 TABLET, ORALLY DISINTEGRATING ORAL EVERY 30 MIN PRN
Status: DISCONTINUED | OUTPATIENT
Start: 2024-04-24 | End: 2024-04-24 | Stop reason: HOSPADM

## 2024-04-24 RX ORDER — PROPOFOL 10 MG/ML
INJECTION, EMULSION INTRAVENOUS PRN
Status: DISCONTINUED | OUTPATIENT
Start: 2024-04-24 | End: 2024-04-24

## 2024-04-24 RX ORDER — ONDANSETRON 2 MG/ML
4 INJECTION INTRAMUSCULAR; INTRAVENOUS EVERY 6 HOURS PRN
Status: DISCONTINUED | OUTPATIENT
Start: 2024-04-24 | End: 2024-04-25 | Stop reason: HOSPADM

## 2024-04-24 RX ORDER — NALOXONE HYDROCHLORIDE 0.4 MG/ML
0.2 INJECTION, SOLUTION INTRAMUSCULAR; INTRAVENOUS; SUBCUTANEOUS
Status: DISCONTINUED | OUTPATIENT
Start: 2024-04-24 | End: 2024-04-25 | Stop reason: HOSPADM

## 2024-04-24 RX ORDER — HYDROMORPHONE HCL IN WATER/PF 6 MG/30 ML
0.4 PATIENT CONTROLLED ANALGESIA SYRINGE INTRAVENOUS EVERY 5 MIN PRN
Status: DISCONTINUED | OUTPATIENT
Start: 2024-04-24 | End: 2024-04-24 | Stop reason: HOSPADM

## 2024-04-24 RX ORDER — FENTANYL CITRATE 50 UG/ML
INJECTION, SOLUTION INTRAMUSCULAR; INTRAVENOUS PRN
Status: DISCONTINUED | OUTPATIENT
Start: 2024-04-24 | End: 2024-04-24

## 2024-04-24 RX ORDER — ONDANSETRON 2 MG/ML
4 INJECTION INTRAMUSCULAR; INTRAVENOUS EVERY 30 MIN PRN
Status: DISCONTINUED | OUTPATIENT
Start: 2024-04-24 | End: 2024-04-24 | Stop reason: HOSPADM

## 2024-04-24 RX ORDER — HYDROCODONE BITARTRATE AND ACETAMINOPHEN 7.5; 325 MG/1; MG/1
1 TABLET ORAL EVERY 6 HOURS PRN
Qty: 20 TABLET | Refills: 0 | Status: SHIPPED | OUTPATIENT
Start: 2024-04-24

## 2024-04-24 RX ORDER — HYDROMORPHONE HCL IN WATER/PF 6 MG/30 ML
0.2 PATIENT CONTROLLED ANALGESIA SYRINGE INTRAVENOUS EVERY 5 MIN PRN
Status: DISCONTINUED | OUTPATIENT
Start: 2024-04-24 | End: 2024-04-24 | Stop reason: HOSPADM

## 2024-04-24 RX ORDER — ONDANSETRON 2 MG/ML
4 INJECTION INTRAMUSCULAR; INTRAVENOUS EVERY 30 MIN PRN
Status: DISCONTINUED | OUTPATIENT
Start: 2024-04-24 | End: 2024-04-24

## 2024-04-24 RX ORDER — CEFAZOLIN SODIUM/WATER 2 G/20 ML
2 SYRINGE (ML) INTRAVENOUS SEE ADMIN INSTRUCTIONS
Status: DISCONTINUED | OUTPATIENT
Start: 2024-04-24 | End: 2024-04-24 | Stop reason: HOSPADM

## 2024-04-24 RX ORDER — AMOXICILLIN 250 MG
1 CAPSULE ORAL 2 TIMES DAILY
Status: DISCONTINUED | OUTPATIENT
Start: 2024-04-24 | End: 2024-04-25 | Stop reason: HOSPADM

## 2024-04-24 RX ORDER — LISINOPRIL 20 MG/1
20 TABLET ORAL EVERY EVENING
Status: DISCONTINUED | OUTPATIENT
Start: 2024-04-24 | End: 2024-04-25 | Stop reason: HOSPADM

## 2024-04-24 RX ORDER — HYDROCODONE BITARTRATE AND ACETAMINOPHEN 7.5; 325 MG/1; MG/1
1-2 TABLET ORAL EVERY 6 HOURS PRN
Status: DISCONTINUED | OUTPATIENT
Start: 2024-04-24 | End: 2024-04-25 | Stop reason: HOSPADM

## 2024-04-24 RX ORDER — LIDOCAINE 40 MG/G
CREAM TOPICAL
Status: DISCONTINUED | OUTPATIENT
Start: 2024-04-24 | End: 2024-04-25 | Stop reason: HOSPADM

## 2024-04-24 RX ORDER — ONDANSETRON 4 MG/1
4 TABLET, ORALLY DISINTEGRATING ORAL EVERY 6 HOURS PRN
Status: DISCONTINUED | OUTPATIENT
Start: 2024-04-24 | End: 2024-04-25 | Stop reason: HOSPADM

## 2024-04-24 RX ORDER — FENTANYL CITRATE-0.9 % NACL/PF 10 MCG/ML
PLASTIC BAG, INJECTION (ML) INTRAVENOUS CONTINUOUS PRN
Status: DISCONTINUED | OUTPATIENT
Start: 2024-04-24 | End: 2024-04-24

## 2024-04-24 RX ORDER — ATORVASTATIN CALCIUM 10 MG/1
10 TABLET, FILM COATED ORAL EVERY EVENING
COMMUNITY

## 2024-04-24 RX ORDER — BISACODYL 10 MG
10 SUPPOSITORY, RECTAL RECTAL DAILY PRN
Status: DISCONTINUED | OUTPATIENT
Start: 2024-04-24 | End: 2024-04-25 | Stop reason: HOSPADM

## 2024-04-24 RX ORDER — PROPOFOL 10 MG/ML
INJECTION, EMULSION INTRAVENOUS CONTINUOUS PRN
Status: DISCONTINUED | OUTPATIENT
Start: 2024-04-24 | End: 2024-04-24

## 2024-04-24 RX ORDER — SODIUM CHLORIDE, SODIUM LACTATE, POTASSIUM CHLORIDE, CALCIUM CHLORIDE 600; 310; 30; 20 MG/100ML; MG/100ML; MG/100ML; MG/100ML
INJECTION, SOLUTION INTRAVENOUS CONTINUOUS
Status: DISCONTINUED | OUTPATIENT
Start: 2024-04-24 | End: 2024-04-25 | Stop reason: HOSPADM

## 2024-04-24 RX ORDER — FENTANYL CITRATE 50 UG/ML
25 INJECTION, SOLUTION INTRAMUSCULAR; INTRAVENOUS EVERY 5 MIN PRN
Status: DISCONTINUED | OUTPATIENT
Start: 2024-04-24 | End: 2024-04-24 | Stop reason: HOSPADM

## 2024-04-24 RX ORDER — CEFAZOLIN SODIUM/WATER 2 G/20 ML
2 SYRINGE (ML) INTRAVENOUS
Status: COMPLETED | OUTPATIENT
Start: 2024-04-24 | End: 2024-04-24

## 2024-04-24 RX ORDER — CEFAZOLIN SODIUM 2 G/100ML
2 INJECTION, SOLUTION INTRAVENOUS EVERY 8 HOURS
Qty: 200 ML | Refills: 0 | Status: COMPLETED | OUTPATIENT
Start: 2024-04-24 | End: 2024-04-25

## 2024-04-24 RX ORDER — HYDROMORPHONE HCL IN WATER/PF 6 MG/30 ML
0.4 PATIENT CONTROLLED ANALGESIA SYRINGE INTRAVENOUS
Status: DISCONTINUED | OUTPATIENT
Start: 2024-04-24 | End: 2024-04-25 | Stop reason: HOSPADM

## 2024-04-24 RX ORDER — LIDOCAINE 40 MG/G
CREAM TOPICAL
Status: DISCONTINUED | OUTPATIENT
Start: 2024-04-24 | End: 2024-04-24 | Stop reason: HOSPADM

## 2024-04-24 RX ORDER — HYDROMORPHONE HCL IN WATER/PF 6 MG/30 ML
0.2 PATIENT CONTROLLED ANALGESIA SYRINGE INTRAVENOUS
Status: DISCONTINUED | OUTPATIENT
Start: 2024-04-24 | End: 2024-04-25 | Stop reason: HOSPADM

## 2024-04-24 RX ORDER — HYDROXYZINE HYDROCHLORIDE 10 MG/1
10-20 TABLET, FILM COATED ORAL EVERY 4 HOURS PRN
Qty: 20 TABLET | Refills: 0 | Status: SHIPPED | OUTPATIENT
Start: 2024-04-24

## 2024-04-24 RX ORDER — POLYETHYLENE GLYCOL 3350 17 G/17G
17 POWDER, FOR SOLUTION ORAL DAILY
Status: DISCONTINUED | OUTPATIENT
Start: 2024-04-25 | End: 2024-04-25 | Stop reason: HOSPADM

## 2024-04-24 RX ORDER — FENTANYL CITRATE 50 UG/ML
50 INJECTION, SOLUTION INTRAMUSCULAR; INTRAVENOUS EVERY 5 MIN PRN
Status: DISCONTINUED | OUTPATIENT
Start: 2024-04-24 | End: 2024-04-24 | Stop reason: HOSPADM

## 2024-04-24 RX ORDER — ONDANSETRON 4 MG/1
4 TABLET, ORALLY DISINTEGRATING ORAL EVERY 30 MIN PRN
Status: DISCONTINUED | OUTPATIENT
Start: 2024-04-24 | End: 2024-04-24

## 2024-04-24 RX ADMIN — ASPIRIN 81 MG: 81 TABLET, COATED ORAL at 20:09

## 2024-04-24 RX ADMIN — Medication 5 MG: at 10:33

## 2024-04-24 RX ADMIN — FENTANYL CITRATE 50 MCG: 50 INJECTION, SOLUTION INTRAMUSCULAR; INTRAVENOUS at 08:51

## 2024-04-24 RX ADMIN — Medication 10 MG: at 10:15

## 2024-04-24 RX ADMIN — BUPIVACAINE 10 ML: 13.3 INJECTION, SUSPENSION, LIPOSOMAL INFILTRATION at 08:51

## 2024-04-24 RX ADMIN — FENTANYL CITRATE 50 MCG: 50 INJECTION INTRAMUSCULAR; INTRAVENOUS at 10:42

## 2024-04-24 RX ADMIN — TRANEXAMIC ACID 1950 MG: 650 TABLET ORAL at 07:46

## 2024-04-24 RX ADMIN — MIDAZOLAM HYDROCHLORIDE 1 MG: 1 INJECTION, SOLUTION INTRAMUSCULAR; INTRAVENOUS at 08:51

## 2024-04-24 RX ADMIN — Medication 5 MG: at 11:09

## 2024-04-24 RX ADMIN — Medication 15 MCG/MIN: at 11:12

## 2024-04-24 RX ADMIN — FENTANYL CITRATE 100 MCG: 50 INJECTION INTRAMUSCULAR; INTRAVENOUS at 10:48

## 2024-04-24 RX ADMIN — SODIUM CHLORIDE, POTASSIUM CHLORIDE, SODIUM LACTATE AND CALCIUM CHLORIDE: 600; 310; 30; 20 INJECTION, SOLUTION INTRAVENOUS at 11:12

## 2024-04-24 RX ADMIN — BUPIVACAINE HYDROCHLORIDE 10 ML: 5 INJECTION, SOLUTION EPIDURAL; INTRACAUDAL; PERINEURAL at 08:51

## 2024-04-24 RX ADMIN — ACETAMINOPHEN 975 MG: 325 TABLET ORAL at 23:26

## 2024-04-24 RX ADMIN — SUGAMMADEX 200 MG: 100 INJECTION, SOLUTION INTRAVENOUS at 11:53

## 2024-04-24 RX ADMIN — SENNOSIDES AND DOCUSATE SODIUM 1 TABLET: 8.6; 5 TABLET ORAL at 20:09

## 2024-04-24 RX ADMIN — PROPOFOL 50 MG: 10 INJECTION, EMULSION INTRAVENOUS at 10:48

## 2024-04-24 RX ADMIN — ATORVASTATIN CALCIUM 10 MG: 10 TABLET, FILM COATED ORAL at 20:09

## 2024-04-24 RX ADMIN — ROCURONIUM BROMIDE 5 MG: 10 INJECTION, SOLUTION INTRAVENOUS at 11:12

## 2024-04-24 RX ADMIN — SODIUM CHLORIDE, POTASSIUM CHLORIDE, SODIUM LACTATE AND CALCIUM CHLORIDE: 600; 310; 30; 20 INJECTION, SOLUTION INTRAVENOUS at 08:23

## 2024-04-24 RX ADMIN — ACETAMINOPHEN 975 MG: 325 TABLET ORAL at 15:47

## 2024-04-24 RX ADMIN — FENTANYL CITRATE 50 MCG: 50 INJECTION INTRAMUSCULAR; INTRAVENOUS at 10:03

## 2024-04-24 RX ADMIN — DEXAMETHASONE SODIUM PHOSPHATE 10 MG: 10 INJECTION, SOLUTION INTRAMUSCULAR; INTRAVENOUS at 10:15

## 2024-04-24 RX ADMIN — Medication 5 MG: at 10:54

## 2024-04-24 RX ADMIN — PROPOFOL 150 MG: 10 INJECTION, EMULSION INTRAVENOUS at 10:03

## 2024-04-24 RX ADMIN — ROCURONIUM BROMIDE 50 MG: 10 INJECTION, SOLUTION INTRAVENOUS at 10:03

## 2024-04-24 RX ADMIN — PROPOFOL 175 MCG/KG/MIN: 10 INJECTION, EMULSION INTRAVENOUS at 10:03

## 2024-04-24 RX ADMIN — CEFAZOLIN SODIUM 2 G: 2 INJECTION, SOLUTION INTRAVENOUS at 17:27

## 2024-04-24 RX ADMIN — Medication 2 G: at 10:00

## 2024-04-24 RX ADMIN — ACETAMINOPHEN 975 MG: 325 TABLET ORAL at 07:46

## 2024-04-24 RX ADMIN — ONDANSETRON 4 MG: 2 INJECTION INTRAMUSCULAR; INTRAVENOUS at 11:54

## 2024-04-24 RX ADMIN — FLUOXETINE 20 MG: 20 CAPSULE ORAL at 14:04

## 2024-04-24 ASSESSMENT — ACTIVITIES OF DAILY LIVING (ADL)
ADLS_ACUITY_SCORE: 26
ADLS_ACUITY_SCORE: 20
ADLS_ACUITY_SCORE: 26
ADLS_ACUITY_SCORE: 22
ADLS_ACUITY_SCORE: 26
ADLS_ACUITY_SCORE: 26
ADLS_ACUITY_SCORE: 20
ADLS_ACUITY_SCORE: 20
ADLS_ACUITY_SCORE: 21
ADLS_ACUITY_SCORE: 26
ADLS_ACUITY_SCORE: 20

## 2024-04-24 ASSESSMENT — ENCOUNTER SYMPTOMS
DYSRHYTHMIAS: 0
SEIZURES: 0

## 2024-04-24 ASSESSMENT — LIFESTYLE VARIABLES: TOBACCO_USE: 0

## 2024-04-24 ASSESSMENT — COPD QUESTIONNAIRES: COPD: 0

## 2024-04-24 NOTE — ANESTHESIA PROCEDURE NOTES
Interscalene Procedure Note    Pre-Procedure   Staff -        Anesthesiologist:  Nadege Licea MD       Performed By: anesthesiologist       Location: pre-op       Procedure Start/Stop Times: 4/24/2024 8:51 AM and 4/24/2024 9:00 AM       Pre-Anesthestic Checklist: patient identified, IV checked, site marked, risks and benefits discussed, informed consent, monitors and equipment checked, pre-op evaluation, at physician/surgeon's request and post-op pain management  Timeout:       Correct Patient: Yes        Correct Procedure: Yes        Correct Site: Yes        Correct Position: Yes        Correct Laterality: Yes        Site Marked: Yes  Procedure Documentation  Procedure: Interscalene       Laterality: left       Patient position: beachchair.       Patient Prep/Sterile Barriers: sterile gloves, mask       Skin prep: Chloraprep       Needle Type: other (Stimuplex)       Needle Gauge: 20.        Needle Length (Inches): 2        Ultrasound guided       1. Ultrasound was used to identify targeted nerve, plexus, vascular marker, or fascial plane and place a needle adjacent to it in real-time.       2. Ultrasound was used to visualize the spread of anesthetic in close proximity to the above referenced structure.       3. A permanent image is entered into the patient's record.       4. The visualized anatomic structures appeared normal.       5. There were no apparent abnormal pathologic findings.    Assessment/Narrative         The placement was negative for: blood aspirated, painful injection and site bleeding       Paresthesias: No.       Bolus given via needle..        Secured via.        Insertion/Infusion Method: Single Shot       Complications: none       Injection made incrementally with aspirations every 5 mL.    Medication(s) Administered   Bupivacaine 0.5% PF (Infiltration) - Infiltration   10 mL - 4/24/2024 8:51:00 AM  Bupivacaine liposome (Exparel) 1.3% LA inj susp (Infiltration) - Infiltration   10 mL  "- 4/24/2024 8:51:00 AM  Medication Administration Time: 4/24/2024 8:51 AM      FOR [facility] ONLY:   Pain Team Contact information: please page the Pain Team Via [place]. Search \"Pain\". During daytime hours, please page the attending first. At night please page the resident first.      "

## 2024-04-24 NOTE — OP NOTE
Operative Note    Name:  Fatimah Winkler  :  1950  MRN:  [id number]  Procedure Date:  2024    Preoperative Diagnosis:  Left Shoulder DJD and Rotator cuff tear    Postoperative Diagnosis:  Same with high-grade tendinopathy and attenuation of the supraspinatus    Procedures:  1.Left Reverse Total Shoulder Arthroplasty  2.Open biceps tenodesis    Surgeon(s):   Lj Kraus MD    Asst.   Adamaris Daily PA-C PA-C assistance was required due to the complexity of the procedure, for patient positioning, instrumentation assistance, soft tissue retraction and patient safety.      Circulator: Grace Gastelum, SANDY; Nader Gleason, RN  Relief Scrub: Walt Ly; Kelly Mireles  Scrub Person: Rosalie Hartley; Kena Winkler    Anesthesia:   General and Interscalene block with Exparel     Estimated Blood Loss:  150 mL    Findings: DJD Left shoulder     Condition on discharge from OR:  stable    Drains: none     Specimens: None    Tissue Removed, Not Sent: Humeral head    Complications: none     Disposition:  Stable and awake to postanesthesia recovery..    INDICATION FOR OPERATION:  Fatimah Winkler is a 74 year old female with a history of shoulder pain and stiffness and she has failed nonsurgical treatment. XR showed evidence of severe osteoarthritis of the shoulder. Recommended she undergo shoulder arthroplasty. Risks and benefits of the planned procedure as well as details of surgery were discussed with the patient. Consent was obtained.     REPORT OF OPERATION:   The patient was brought to operating room and placed supine on the operating table. An interscalene block was placed by Anesthesia preoperatively and she was given appropriate preoperative antibiotic. After induction of general anesthesia, she was placed in the beach-chair position on the operating room table. All bony prominences were well padded. The shoulder was prepped and draped in normal sterile fashion.    A standard anterior  deltopectoral incision was utilized. Deep retractors were placed below the clavipectoral fascia and the deltoid. The humeral head was inspected. The rotator cuff was noted to be intact with significant attenuation and high-grade tendinopathy of the supraspinatus..   Biceps was tenodesed to the superior aspect of the pectoralis tendon. Subscapularis was released off the lesser tuberosity and tagged.    The humeral head was exposed and noted to have a significant end-stage osteoarthritis with wear pattern of the central humeral head with anterior and inferior humeral osteophytes, which were resected. Humeral head cut was then performed using appropriate instrumentation. The humerus was then reamed and broached, and a trial stem with a head protection plate was placed.   The glenoid was then exposed. The labrum was excised circumferentially.  Early posterior glenoid wear on the glenoid was noted.  Next, the glenoid was drilled and reamed and prepared for glenoid implant. The 25 mm glenoid baseplate was then impacted and the peripheral screws were placed. A trial glenosphere was placed.   The humeral component was trialed, and then the 36 mm +3 glenosphere implant was then placed.  The humerus was again exposed and the ascend flex 4B humeral implant was impacted into place.  The humeral tray and polyethelene was then again trialed.  Next a standard humeral polyethelene cup and low offset humeral tray was impacted into place.    A final reduction was performed, and the shoulder was copiously irrigated with saline irrigation. All instruments were removed. Subscapularis was repaired back to the lesser tuberosity with #2 FiberWire sutures..The incison was again copiously irrigated with saline irrigation and 1 gram of vancomycin powder was placed in the shoulder joint and subdeltoid space.  The incision was closed in layers with #1 Ethibond closure of the deltopectoral split, 0 Vicryl and 2-0 Vicryl subcutaneous closure, and  skin staples. A sterile dressing was placed on the shoulder.     Postoperatively she was placed in a shoulder SlingShot brace and  transferred in stable, awake condition to Postanesthesia Recovery.  Following surgery she will be maintained in the sling for 4-6 weeks postoperatively, may discontinue abduction pillow at 2 week postoperative and begin PT at approximately 3-4 weeks postoperatively.        Lj Kraus MD   Date: 4/24/2024  Time: 12:14 PM    Implants:  Implant Name Type Inv. Item Serial No.  Lot No. LRB No. Used Action   BASEPLATE STD 25MM - [id number] Total Joint Component/Insert BASEPLATE STD 25MM [id number] [employer]  Left 1 Implanted   glenosphere   [id number]   Left 1 Implanted   SCREW CENTRAL 6.5X30MM [id number] - [id number] Metallic Hardware/[place] SCREW CENTRAL 6.5X30MM [id number]  [employer] NA Left 1 Implanted   SCREW PERIPHERAL 18MM [id number] - [id number] Metallic Hardware/[place] SCREW PERIPHERAL 18MM [id number]  [employer] NA Left 2 Implanted   SCREW PERIPHERAL 22MM - [id number] Metallic Hardware/[place] SCREW PERIPHERAL 22MM  [employer]  Left 1 Implanted   SCREW PERIPHERAL 14MM [id number] - [id number] Metallic Hardware/[place] SCREW PERIPHERAL 14MM [id number]  [employer] NA Left 1 Implanted   STEM HUMERAL ANATOMIC STD PTC 4B - [id number] Total Joint Component/Insert STEM HUMERAL ANATOMIC STD PTC 4B [id number] [employer]  Left 1 Implanted   TRAY REVERSE LOW OFFSET +0 [id number] - [id number] Total Joint Component/Insert TRAY REVERSE LOW OFFSET +0 [id number] [employer]  Left 1 Implanted   INSERT REVISION REVERSE +6/12 36MM - [id number] Total Joint Component/Insert INSERT REVISION REVERSE +6/12 36MM [id number] [employer]  Left 1 Implanted

## 2024-04-24 NOTE — INTERVAL H&P NOTE
"I have reviewed the surgical (or preoperative) H&P that is linked to this encounter, and examined the patient. There are no significant changes    Clinical Conditions Present on Arrival:  Clinically Significant Risk Factors Present on Admission                  # Obesity: Estimated body mass index is 35.78 kg/m  as calculated from the following:    Height as of this encounter: 1.6 m (5' 3\").    Weight as of this encounter: 91.6 kg (202 lb).       "

## 2024-04-24 NOTE — ANESTHESIA POSTPROCEDURE EVALUATION
Patient: Fatimah Winkler    Procedure: Procedure(s):  LEFT REVERSE TOTAL SHOULDER ARTHROPLASTY       Anesthesia Type:  General    Note:  Disposition: Admission   Postop Pain Control: Uneventful            Sign Out: Well controlled pain   PONV: No   Neuro/Psych: Uneventful            Sign Out: Acceptable/Baseline neuro status   Airway/Respiratory: Uneventful            Sign Out: Acceptable/Baseline resp. status   CV/Hemodynamics: Uneventful            Sign Out: Acceptable CV status; No obvious hypovolemia; No obvious fluid overload   Other NRE: NONE   DID A NON-ROUTINE EVENT OCCUR? No           Last vitals:  Vitals Value Taken Time   /69 04/24/24 1300   Temp 36.7  C (98  F) 04/24/24 1206   Pulse 61 04/24/24 1305   Resp 25 04/24/24 1304   SpO2 95 % 04/24/24 1305   Vitals shown include unfiled device data.    Electronically Signed By: Nadege Licea MD  April 24, 2024  1:49 PM

## 2024-04-24 NOTE — ANESTHESIA PREPROCEDURE EVALUATION
Anesthesia Pre-Procedure Evaluation    Patient: Fatimah Winkler   MRN: [id number] : 1950        Procedure : Procedure(s):  LEFT REVERSE TOTAL SHOULDER ARTHROPLASTY          Past Medical History:   Diagnosis Date    Complication of anesthesia     symptomatic low BP for days after surgery    Hyperlipidemia     Hypertension     PONV (postoperative nausea and vomiting)       Past Surgical History:   Procedure Laterality Date    JOINT REPLACEMENT Right 2014    TAMMY    REVERSE ARTHROPLASTY SHOULDER Right 2022    Procedure: RIGHT REVERSE TOTAL SHOULDER ARTHROPLASTY;  Surgeon: Lj Kraus MD;  Location: [facility]    TOTAL HIP ARTHROPLASTY Right 2014    Procedure: RIGHT TOTAL HIP ARTHROPLASTY;  Surgeon: Taco Perez MD;  Location: [facility];  Service:     TOTAL HIP ARTHROPLASTY Left 2014    Procedure: HIP TOTAL ARTHROPLASTY LEFT;  Surgeon: Taco Perez MD;  Location: [facility];  Service:       Allergies   Allergen Reactions    Oxycodone Shortness Of Breath    Simvastatin Muscle Pain (Myalgia)    Sulfamethoxazole Itching      Social History     Tobacco Use    Smoking status: Never    Smokeless tobacco: Never   Substance Use Topics    Alcohol use: Yes     Comment: glass a month      Wt Readings from Last 1 Encounters:   22 91.1 kg (200 lb 12.8 oz)        Anesthesia Evaluation            ROS/MED HX  ENT/Pulmonary:    (-) tobacco use, asthma, COPD, sleep apnea, HERMILO risk factors and recent URI   Neurologic:    (-) no seizures and no CVA   Cardiovascular:     (+) Dyslipidemia hypertension- -   -  - -                                   (-) taking anticoagulants/antiplatelets, syncope and arrhythmias   METS/Exercise Tolerance: >4 METS    Hematologic:    (-) anemia   Musculoskeletal: Comment: Osteoarthritis left shoulder       GI/Hepatic:    (-) GERD   Renal/Genitourinary:    (-) renal disease   Endo:     (+)               Obesity,    (-)  "Type I DM, Type II DM and thyroid disease   Psychiatric/Substance Use:     (+) psychiatric history anxiety and depression    (-) chronic opioid use history   Infectious Disease:    (-) Recent Fever   Malignancy:       Other:            Physical Exam    Airway        Mallampati: II   TM distance: > 3 FB   Neck ROM: full   Mouth opening: > 3 cm    Respiratory Devices and Support         Dental     Comment: Good dentition, no loose or removable teeth        Cardiovascular          Rhythm and rate: normal     Pulmonary           breath sounds clear to auscultation           OUTSIDE LABS:  CBC:   Lab Results   Component Value Date    WBC 5.4 06/22/2022    HGB 11.4 (L) 06/23/2022    HGB 13.6 06/22/2022    HCT 42.3 06/22/2022     06/22/2022     BMP:   Lab Results   Component Value Date     04/02/2024     09/11/2023    POTASSIUM 4.6 04/02/2024    POTASSIUM 4.3 09/11/2023    CHLORIDE 99 04/02/2024    CHLORIDE 101 09/11/2023    CO2 25 04/02/2024    CO2 19 (L) 09/11/2023    BUN 17.3 04/02/2024    BUN 18.1 09/11/2023    CR 0.85 04/02/2024    CR 0.86 09/11/2023    GLC 89 04/02/2024     (H) 09/11/2023     COAGS:   Lab Results   Component Value Date    PTT 31 06/22/2022    INR 1.02 06/22/2022     POC: No results found for: \"BGM\", \"HCG\", \"HCGS\"  HEPATIC: No results found for: \"ALBUMIN\", \"PROTTOTAL\", \"ALT\", \"AST\", \"GGT\", \"ALKPHOS\", \"BILITOTAL\", \"BILIDIRECT\", \"ANGELA\"  OTHER:   Lab Results   Component Value Date    DESTINY 9.4 04/02/2024    MAG 2.1 01/16/2023       Anesthesia Plan    ASA Status:  2    NPO Status:  NPO Appropriate    Anesthesia Type: General.     - Airway: ETT      Maintenance: TIVA.   Techniques and Equipment:       - Drips/Meds: Phenylephrine     Consents    Anesthesia Plan(s) and associated risks, benefits, and realistic alternatives discussed. Questions answered and patient/representative(s) expressed understanding.     - Discussed:     - Discussed with:  Patient      - Extended " Intubation/Ventilatory Support Discussed: No.      - Patient is DNR/DNI Status: No     Use of blood products discussed: No .     Postoperative Care    Pain management: IV analgesics, Multi-modal analgesia, Peripheral nerve block (Single Shot).   PONV prophylaxis: Ondansetron (or other 5HT-3), Dexamethasone or Solumedrol     Comments:    Other Comments: Single shot interscalene nerve block   GETA  TIVA  Phenylephrine gtt  Dexamethasone, ondansetron PONV ppx           Nadege Licea MD    I have reviewed the pertinent notes and labs in the chart from the past 30 days and (re)examined the patient.  Any updates or changes from those notes are reflected in this note.

## 2024-04-24 NOTE — CONSULTS
"[facility] MEDICINE CONSULT NOTE   Physician requesting consult: Lj Kraus MD    Reason for consult: Postoperative medical management of medical co-morbidities as below    Identification/Summary:   Fatimah Winkler is a 74 year old female with a PMH of HTN, hyperlipidemia, depression, anxiety, osteoarthritis. Underwent L shoulder arthroplasty on 4/24/2024.    Assessment and Plan:  Hypertension  Hold lisinopril, triamterene-hydrochlorothiazide for today    Hyperlipidemia  Continue atorvastatin    Depression  Continue fluoxetine    Anticoagulation.  Deferred to surgery      Code status:Full Code   [facility] service was asked to evaluate patient for postoperative medical management as follows below. Please resume the home medications as reconciled and further noted with ordered hold parameters.  Thank you for this consult; we will continue to follow this patient until discharge.    Procedure(s):  LEFT REVERSE TOTAL SHOULDER ARTHROPLASTY  Day of Surgery  Estimated Blood Loss:  150 mL  Hospital Problem List   No problem-specific Assessment & Plan notes found for this encounter.    Active Problems:    Status post shoulder surgery      -Reviewed the patient's preoperative H and P and updated missing elements.  -Home medication reconciliation has been reviewed.  Medications have been ordered as noted from the home list and changes are documented above     Clinically Significant Risk Factors Present on Admission                  # Hypertension: Noted on problem list      # Obesity: Estimated body mass index is 35.78 kg/m  as calculated from the following:    Height as of this encounter: 1.6 m (5' 3\").    Weight as of this encounter: 91.6 kg (202 lb).              HISTORY     Fatimah Winkler is a 74 year old female with a PMH of HTN, hyperlipidemia, depression, anxiety, osteoarthritis. Underwent L shoulder arthroplasty on 4/24/2024.    Patient has no complaint at all after surgery. She is now off " oxygen.      Past Medical History     Past Medical History:  No date: Complication of anesthesia      Comment:  symptomatic low BP for days after surgery  No date: Hyperlipidemia  No date: Hypertension  No date: PONV (postoperative nausea and vomiting)     Patient Active Problem List    Diagnosis Date Noted    Status post shoulder surgery 06/22/2022     Priority: Medium    Postoperative anemia due to acute blood loss 12/19/2014     Priority: Medium    Postoperative hypotension 12/19/2014     Priority: Medium    Hypertension      Priority: Medium    Hyperlipidemia      Priority: Medium    Hip osteoarthritis 12/18/2014     Priority: Medium    Acute blood loss anemia 09/26/2014     Priority: Medium    Osteoarthritis of right hip 09/25/2014     Priority: Medium    Hypotension 09/25/2014     Priority: Medium     Surgical History     Past Surgical History:   Procedure Laterality Date    JOINT REPLACEMENT Right 09/25/2014    TAMMY    REVERSE ARTHROPLASTY SHOULDER Right 6/22/2022    Procedure: RIGHT REVERSE TOTAL SHOULDER ARTHROPLASTY;  Surgeon: Lj Kraus MD;  Location: [facility]    TOTAL HIP ARTHROPLASTY Right 9/25/2014    Procedure: RIGHT TOTAL HIP ARTHROPLASTY;  Surgeon: Taco Perez MD;  Location: [facility];  Service:     TOTAL HIP ARTHROPLASTY Left 12/18/2014    Procedure: HIP TOTAL ARTHROPLASTY LEFT;  Surgeon: Taco Perez MD;  Location: [facility];  Service:      Family History    History reviewed. No pertinent family history.   Social History      Social History     Tobacco Use    Smoking status: Never    Smokeless tobacco: Never   Vaping Use    Vaping status: Never Used   Substance Use Topics    Alcohol use: Yes     Comment: glass a month    Drug use: No      Allergies     Allergies   Allergen Reactions    Oxycodone Shortness Of Breath    Simvastatin Muscle Pain (Myalgia)    Sulfamethoxazole Itching       Prior to Admission Medications      Prior to  Admission Medications   Prescriptions Last Dose Informant Patient Reported? Taking?   FLUoxetine (PROZAC) 20 MG capsule 4/23/2024 at AM  Yes Yes   Sig: Take 20 mg by mouth daily   atorvastatin (LIPITOR) 10 MG tablet 4/23/2024  Yes Yes   Sig: Take 10 mg by mouth every evening   cholecalciferol, vitamin D3, 1,000 unit tablet 4/17/2024  Yes Yes   Sig: Take 2,000 Units by mouth 2 times daily   co-enzyme Q-10 100 MG CAPS capsule 4/17/2024  Yes Yes   Sig: Take 100 mg by mouth daily   famotidine (PEPCID) 20 MG tablet 4/22/2024 at AM  Yes Yes   Sig: Take 20 mg by mouth daily   fish oil-omega-3 fatty acids 1000 MG capsule 4/17/2024  Yes Yes   Sig: Take 1,000 mg by mouth 2 times daily   glucosamine-chondroitin 500-400 MG CAPS per capsule 4/17/2024  Yes Yes   Sig: Take 1 capsule by mouth daily   lisinopril (ZESTRIL) 20 MG tablet 4/23/2024 at PM  Yes Yes   Sig: Take 20 mg by mouth every evening   loratadine (CLARITIN) 10 MG tablet 4/23/2024 at AM  Yes Yes   Sig: Take 10 mg by mouth daily   triamterene-HCTZ (MAXZIDE-25) 37.5-25 MG tablet 4/23/2024 at AM  Yes Yes   Sig: Take 1 tablet by mouth daily      Facility-Administered Medications: None      Review of Systems     A 12 point comprehensive review of systems was negative except as noted above in HPI.    OBJECTIVE         Physical Exam   Temp:  [96  F (35.6  C)-98  F (36.7  C)] 98  F (36.7  C)  Pulse:  [53-69] 62  Resp:  [8-25] 18  BP: (114-152)/(58-81) 137/69  SpO2:  [92 %-100 %] 94 %  Body mass index is 35.78 kg/m .  General Appearance: Alert and wake, not in distress  Respiratory: clear lungs, no crackles or wheezing  Cardiovascular: rhythmic, normal S1 and S2, no murmur  GI: soft, non-tender, normal bowel sound  Neurology: oriented x 3  Psych: cooperative and calm, normal affect    I reviewed patient's preoperative evaluation note on 4/2/2024  I reviewed patient's preoperative lab tests on 4/2/2024 and CBC on 4/24      Thank you for this consultation.  Appreciate the  opportunity to participate in the care of Fatimah Winkler, please feel free to contact us for any questions or concerns.    SOLEDAD ESCOBEDO MD  [facility]  Phone: #[phone]

## 2024-04-24 NOTE — PHARMACY-ADMISSION MEDICATION HISTORY
Pharmacist Admission Medication History    Admission medication history is complete. The information provided in this note is only as accurate as the sources available at the time of the update.    Information Source(s): Patient and CareEverywhere/SureScripts via in-person    Pertinent Information:   Allergies reviewed with patient and updates made in EHR: no    Medication History Completed By: Rosie Allen RPH 4/24/2024 8:38 AM    PTA Med List   Medication Sig Last Dose    atorvastatin (LIPITOR) 10 MG tablet Take 10 mg by mouth every evening 4/23/2024    cholecalciferol, vitamin D3, 1,000 unit tablet Take 2,000 Units by mouth 2 times daily 4/17/2024    co-enzyme Q-10 100 MG CAPS capsule Take 100 mg by mouth daily 4/17/2024    famotidine (PEPCID) 20 MG tablet Take 20 mg by mouth daily 4/22/2024 at AM    fish oil-omega-3 fatty acids 1000 MG capsule Take 1,000 mg by mouth 2 times daily 4/17/2024    FLUoxetine (PROZAC) 20 MG capsule Take 20 mg by mouth daily 4/23/2024 at AM    glucosamine-chondroitin 500-400 MG CAPS per capsule Take 1 capsule by mouth daily 4/17/2024    lisinopril (ZESTRIL) 20 MG tablet Take 20 mg by mouth every evening 4/23/2024 at PM    loratadine (CLARITIN) 10 MG tablet Take 10 mg by mouth daily 4/23/2024 at AM    triamterene-HCTZ (MAXZIDE-25) 37.5-25 MG tablet Take 1 tablet by mouth daily 4/23/2024 at AM

## 2024-04-24 NOTE — ANESTHESIA CARE TRANSFER NOTE
Patient: Fatimah Winkler    Procedure: Procedure(s):  LEFT REVERSE TOTAL SHOULDER ARTHROPLASTY       Diagnosis: Left shoulder pain [M25.512]  Osteoarthritis of left shoulder [M19.012]  Diagnosis Additional Information: No value filed.    Anesthesia Type:   General     Note:    Oropharynx: oropharynx clear of all foreign objects  Level of Consciousness: drowsy  Oxygen Supplementation: face mask  Level of Supplemental Oxygen (L/min / FiO2): 6  Independent Airway: airway patency satisfactory and stable  Dentition: dentition unchanged  Vital Signs Stable: post-procedure vital signs reviewed and stable  Report to RN Given: handoff report given  Patient transferred to: PACU    Handoff Report: Identifed the Patient, Identified the Reponsible Provider, Reviewed the pertinent medical history, Discussed the surgical course, Reviewed Intra-OP anesthesia mangement and issues during anesthesia, Set expectations for post-procedure period and Allowed opportunity for questions and acknowledgement of understanding      Vitals:  Vitals Value Taken Time   /58 04/24/24 1210   Temp     Pulse 68 04/24/24 1210   Resp 16 04/24/24 1210   SpO2 98 % 04/24/24 1210   Vitals shown include unfiled device data.    Electronically Signed By: BLAINE Ocampo CRNA  April 24, 2024  12:11 PM

## 2024-04-24 NOTE — ANESTHESIA PROCEDURE NOTES
Airway       Patient location during procedure: OR       Procedure Start/Stop Times: 4/24/2024 10:06 AM  Staff -        CRNA: Eddie Guo APRN CRNA       Performed By: CRNAIndications and Patient Condition       Indications for airway management: lexi-procedural       Induction type:intravenous       Mask difficulty assessment: 1 - vent by mask    Final Airway Details       Final airway type: endotracheal airway       Successful airway: ETT - single  Endotracheal Airway Details        ETT size (mm): 6.5       Cuffed: yes       Cuff volume (mL): 8       Successful intubation technique: direct laryngoscopy       DL Blade Type: Gavin 2       Grade View of Cords: 1       Adjucts: stylet       Position: Right       Measured from: gums/teeth       Secured at (cm): 21       Bite block used: None    Post intubation assessment        Placement verified by: capnometry, equal breath sounds and chest rise        Number of attempts at approach: 1       Secured with: silk tape       Ease of procedure: easy       Dentition: Intact and Unchanged       Dental guard used and removed. Dental Guard Type: Standard White.    Medication(s) Administered   Medication Administration Time: 4/24/2024 10:06 AM

## 2024-04-25 ENCOUNTER — APPOINTMENT (OUTPATIENT)
Dept: OCCUPATIONAL THERAPY | Facility: CLINIC | Age: 74
End: 2024-04-25
Attending: PHYSICIAN ASSISTANT
Payer: MEDICARE

## 2024-04-25 VITALS
OXYGEN SATURATION: 98 % | HEIGHT: 63 IN | TEMPERATURE: 97.9 F | BODY MASS INDEX: 35.79 KG/M2 | SYSTOLIC BLOOD PRESSURE: 133 MMHG | DIASTOLIC BLOOD PRESSURE: 63 MMHG | WEIGHT: 202 LBS | HEART RATE: 67 BPM | RESPIRATION RATE: 18 BRPM

## 2024-04-25 LAB
FASTING STATUS PATIENT QL REPORTED: ABNORMAL
GLUCOSE SERPL-MCNC: 143 MG/DL (ref 70–99)
HGB BLD-MCNC: 12.7 G/DL (ref 11.7–15.7)

## 2024-04-25 PROCEDURE — 85018 HEMOGLOBIN: CPT | Performed by: PHYSICIAN ASSISTANT

## 2024-04-25 PROCEDURE — 36415 COLL VENOUS BLD VENIPUNCTURE: CPT | Performed by: ORTHOPAEDIC SURGERY

## 2024-04-25 PROCEDURE — 97110 THERAPEUTIC EXERCISES: CPT | Mod: GO

## 2024-04-25 PROCEDURE — 250N000013 HC RX MED GY IP 250 OP 250 PS 637: Performed by: STUDENT IN AN ORGANIZED HEALTH CARE EDUCATION/TRAINING PROGRAM

## 2024-04-25 PROCEDURE — 99232 SBSQ HOSP IP/OBS MODERATE 35: CPT | Performed by: STUDENT IN AN ORGANIZED HEALTH CARE EDUCATION/TRAINING PROGRAM

## 2024-04-25 PROCEDURE — 250N000011 HC RX IP 250 OP 636: Performed by: PHYSICIAN ASSISTANT

## 2024-04-25 PROCEDURE — 82947 ASSAY GLUCOSE BLOOD QUANT: CPT | Performed by: ORTHOPAEDIC SURGERY

## 2024-04-25 PROCEDURE — 250N000013 HC RX MED GY IP 250 OP 250 PS 637: Performed by: PHYSICIAN ASSISTANT

## 2024-04-25 PROCEDURE — 97166 OT EVAL MOD COMPLEX 45 MIN: CPT | Mod: GO

## 2024-04-25 PROCEDURE — 97535 SELF CARE MNGMENT TRAINING: CPT | Mod: GO

## 2024-04-25 RX ADMIN — SENNOSIDES AND DOCUSATE SODIUM 1 TABLET: 8.6; 5 TABLET ORAL at 08:36

## 2024-04-25 RX ADMIN — FAMOTIDINE 20 MG: 20 TABLET, FILM COATED ORAL at 08:36

## 2024-04-25 RX ADMIN — ACETAMINOPHEN 975 MG: 325 TABLET ORAL at 08:35

## 2024-04-25 RX ADMIN — CEFAZOLIN SODIUM 2 G: 2 INJECTION, SOLUTION INTRAVENOUS at 01:17

## 2024-04-25 RX ADMIN — LORATADINE 10 MG: 10 TABLET ORAL at 08:36

## 2024-04-25 RX ADMIN — POLYETHYLENE GLYCOL 3350 17 G: 17 POWDER, FOR SOLUTION ORAL at 08:36

## 2024-04-25 RX ADMIN — FLUOXETINE 20 MG: 20 CAPSULE ORAL at 08:36

## 2024-04-25 RX ADMIN — ASPIRIN 81 MG: 81 TABLET, COATED ORAL at 08:36

## 2024-04-25 RX ADMIN — TRIAMTERENE AND HYDROCHLOROTHIAZIDE 1 TABLET: 37.5; 25 TABLET ORAL at 08:36

## 2024-04-25 ASSESSMENT — ACTIVITIES OF DAILY LIVING (ADL)
ADLS_ACUITY_SCORE: 26
PREVIOUS_RESPONSIBILITIES: MEAL PREP;HOUSEKEEPING;LAUNDRY;SHOPPING;MEDICATION MANAGEMENT;YARDWORK;FINANCES;DRIVING
ADLS_ACUITY_SCORE: 26
ADLS_ACUITY_SCORE: 26

## 2024-04-25 NOTE — PROGRESS NOTES
"Orthopedic Progress Note      Assessment: 1 Day Post-Op  S/P Procedure(s):  LEFT REVERSE TOTAL SHOULDER ARTHROPLASTY @    Plan:   - Continue OT.  - Weightbearing status: NWB left Upper Extremity  - Continue sling & abduction pillow  - Anticoagulation: Aspirin 81 mg bid x30 days, in addition to SCDs, yobani stockings and early ambulation.  - Discharge planning: Discharge to home today pending OT eval.    Subjective:  Pain: Left shoulder pain well controlled on Tylenol and aspirin.  Nausea, Vomiting:  No  Chest pain: No  Lightheadedness, Dizziness:  No  Neuro: Block in effect    Patient is doing well on POD #1. Tolerating oral intake, pain well controlled on oral medications, voiding & ambulating. Ready for discharge. Hgb 12.7 (pre-op 13.9).    Objective:  /63 (BP Location: Right arm)   Pulse 62   Temp 97.2  F (36.2  C) (Oral)   Resp 16   Ht 1.6 m (5' 3\")   Wt 91.6 kg (202 lb)   SpO2 96%   BMI 35.78 kg/m    The patient is A&Ox3. Appears comfortable, sitting up at bedside & dressed. Wearing sling appropriately.  Sensation is intact to light touch in left upper extremity, hand & fingers.  left upper extremity motor strength 5/5 in ulnar, median and radial nerve distributions. Fires axillary. Flexes elbow. Flexes & extends wrist. Full composite fist. Opposes thumb.  Palpable left radial pulse. Hand warm with brisk cap refill in fingers.  Calves are soft and non-tender.   Mild edema & ecchymosis of left shoulder. The incision is covered. Dressing C/D/I.        Pertinent Labs   Lab Results: personally reviewed.   Lab Results   Component Value Date    INR 0.94 04/24/2024    INR 1.02 06/22/2022     Lab Results   Component Value Date    WBC 5.9 04/24/2024    HGB 12.7 04/25/2024    HCT 39.7 04/24/2024    MCV 92 04/24/2024     04/24/2024     Lab Results   Component Value Date     04/02/2024    CO2 25 04/02/2024         Report completed by:  Dalila Wilson PA-C/Dr. Efra Eden " [facility]    Date: 4/25/2024  Time: 8:39 AM

## 2024-04-25 NOTE — PROGRESS NOTES
04/25/24 0730   Appointment Info   Signing Clinician's Name / Credentials (OT) Cici Garcia OTR/L   Quick Adds   Quick Adds Certification   Living Environment   People in Home alone  (Sister will be staying with Pt.)   Current Living Arrangements house  (Able to stay on one floor.)   Self-Care   Usual Activity Tolerance good   Current Activity Tolerance good   Activity/Exercise/Self-Care Comment Pt IND w/ ADLs and IADLs at baseline   Instrumental Activities of Daily Living (IADL)   Previous Responsibilities meal prep;housekeeping;laundry;shopping;medication management;yardwork;finances;driving   General Information   Onset of Illness/Injury or Date of Surgery 04/24/24   Referring Physician Dr. Delmis Kraus   Patient/Family Therapy Goal Statement (OT) to go home   Existing Precautions/Restrictions (S)  shoulder   Left Upper Extremity (Weight-bearing Status) non weight-bearing (NWB)   Cognitive Status Examination   Orientation Status orientation to person, place and time   Affect/Mental Status (Cognitive) WNL   Visual Perception   Visual Impairment/Limitations corrective lenses for reading   Range of Motion Comprehensive   Comment, General Range of Motion No Active ROM to the L U/E   Transfers   Transfers sit-stand transfer   Transfer Comments No walking device used.   Sit-Stand Transfer   Sit-Stand [place] (Transfers) supervision;verbal cues   Balance   Balance Assessment standing dynamic balance   Standing Balance: Dynamic independent   Activities of Daily Living   BADL Assessment/Intervention bathing;upper body dressing;lower body dressing   Upper Body Dressing Assessment/Training   Position (Upper Body Dressing) supported sitting;unsupported standing   [place] Level (Upper Body Dressing) moderate assist (50% patient effort)   Lower Body Dressing Assessment/Training   Position (Lower Body Dressing) supported sitting;unsupported standing   [place] Level (Lower Body Dressing)  don;pants/bottoms;shoes/slippers;socks;independent   Clinical Impression   Criteria for Skilled Therapeutic Interventions Met (OT) Yes, treatment indicated   OT Diagnosis decreased ADLs due to TSA   Influenced by the following impairments TSA   OT Problem List-Impairments impacting ADL activity tolerance impaired;range of motion (ROM);sensation;post-surgical precautions   Assessment of Occupational Performance 3-5 Performance Deficits   Identified Performance Deficits dressing, bathing, transfers   Planned Therapy Interventions (OT) ADL retraining;ROM;home program guidelines;progressive activity/exercise   Clinical Decision Making Complexity (OT) detailed assessment/moderate complexity   Risk & Benefits of therapy have been explained evaluation/treatment results reviewed;patient   OT Total Evaluation Time   OT Eval, Moderate Complexity Minutes (45351) 15   Therapy Certification   Medical Diagnosis TSA   Start of Care Date 04/25/24   Certification date from 04/25/24   Certification date to 05/25/24   OT Goals   Therapy Frequency (OT) One time eval and treatment   OT Predicted Duration/Target Date for Goal Attainment 04/25/24   OT Goals Upper Body Dressing;Lower Body Dressing;Transfers   OT: Upper Body Dressing Minimal assist;Completed   OT: Lower Body Dressing Independent;Completed   OT: Transfer Independent;within precautions;Completed  (chair, bed, toilet.)   Interventions   Interventions Quick Adds Self-Care/Home Management;Therapeutic Procedures/Exercise   Self-Care/Home Management   Self-Care/Home Mgmt/ADL, Compensatory, Meal Prep Minutes (48804) 30   Symptoms Noted During/After Treatment (Meal Preparation/Planning Training) none   Treatment Detail/Skilled Intervention Pt edu on shoulder precautions - pt verbalized understanding  and needing 2-3 cues during session. Pt edu on FB dressing including donning/doffing immobilizer -Indep with L/B dressing.  Min A needed for U/B dressing.  Pt will have her sister avail  to assist.  Edu handout given for compensatory strategies for UB dressing. Pt instructed on bed mobility techniques. STS IND and amb. around room without walking device and no LOB. Pt seated in her chair at end of sesion with chair alarm on and call light in reach.   Therapeutic Procedures/Exercise   Therapeutic Procedure: strength, endurance, ROM, flexibillity minutes (22953) 15   Symptoms Noted During/After Treatment none  (Pt's L U/E still numb from surgery)   Treatment Detail/Skilled Intervention Pt given edu handout on pendulum/UE exercises. Pt instructed on elbow flex/ext, supination/pronation, wrist flex/ext, ulnar/radial deviation, and fist pumps. Pt IND w/ HEP after initial cueing.  Instructed to start Pendulum exercises at home once sensation returns to LUE. Instructed to complete exercises 2x per day at home.   OT Discharge Planning   OT Plan D/C OT.  No PT needed.   OT Discharge Recommendation (DC Rec) other (see comments)  (Defer to Ortho Team.)   OT Rationale for DC Rec Pt will have assist from her sister at home.   OT Brief overview of current status Pt has met her OT goals.  No PT needed.   Total Session Time   Timed Code Treatment Minutes 45   Total Session Time (sum of timed and untimed services) 60    [facility]  OUTPATIENT OCCUPATIONAL THERAPY  EVALUATION  PLAN OF TREATMENT FOR OUTPATIENT REHABILITATION  (COMPLETE FOR INITIAL CLAIMS ONLY)  Patient's Last Name, First Name, M.I.  YOB: 1950  CathiFatimah  M                          Provider's Name  [facility] Medical Record No.  [id number]                             Onset Date:  04/24/24   Start of Care Date:  04/25/24   Type:     ___PT   _X_OT   ___SLP Medical Diagnosis:  TSA                    OT Diagnosis:  decreased ADLs due to TSA Visits from SOC:  1     See note for plan of treatment, functional goals and certification details    I CERTIFY THE NEED FOR THESE  SERVICES FURNISHED UNDER        THIS PLAN OF TREATMENT AND WHILE UNDER MY CARE     (Physician co-signature of this document indicates review and certification of the therapy plan).

## 2024-04-25 NOTE — PLAN OF CARE
Problem: Shoulder Arthroplasty (Total, Jean, Reverse)  Goal: Optimal Coping  Outcome: Progressing     Patient vital signs are at baseline: Yes  Patient able to ambulate as they were prior to admission or with assist devices provided by therapies during their stay:  Yes  Patient MUST void prior to discharge:  Yes  Patient able to tolerate oral intake:  Yes  Pain has adequate pain control using Oral analgesics:  Yes  Does patient have an identified :  Yes  Has goal D/C date and time been discussed with patient:  Yes    Patient a/o x4, very pleasant. VSS on RA. IV fluids SL. Tolerated IV ABX and oral medications. Assist of x1 with a walker and gait belt.  Patient able to urinate adequately . Dressing CDI and still has some n/t in the L. Fingertips, but improving. Immobilizer intact on LUE.  Pain was controlled with scheduled medications, ice therapy, rest, and repositioning.  Denied chest pain, SOB, lightheadedness, dizziness, or N/V.  Bed alarm on for safety precautions. Utilizes call light appropriately.

## 2024-04-25 NOTE — PROGRESS NOTES
"[facility] MEDICINE  PROGRESS NOTE       Securely message me with [employer] (more info)    Code Status: Full Code  Procedure(s):  LEFT REVERSE TOTAL SHOULDER ARTHROPLASTY  1 Day Post-Op  Identification/Summary:   Fatimah Winkler is a 74 year old female with a PMH of HTN, hyperlipidemia, depression, anxiety, osteoarthritis. Underwent L shoulder arthroplasty on 4/24/2024.     Assessment and Plan:  Hypertension  Restart lisinopril, triamterene-hydrochlorothiazide for today     Hyperlipidemia  Continue atorvastatin     Depression  Continue fluoxetine     Anticoagulation.  Deferred to surgery      Anticoagulation   Orders (Includes Only Anticoagulants)    None        Clinically Significant Risk Factors Present on Admission                  # Hypertension: Noted on problem list      # Obesity: Estimated body mass index is 35.78 kg/m  as calculated from the following:    Height as of this encounter: 1.6 m (5' 3\").    Weight as of this encounter: 91.6 kg (202 lb).              Interval History/Subjective:  She reports she is doing okay.  Denies dyspnea, chest pain, or nausea.  She is very happy with still effectiveness of the nerve block.        Physical Exam/Objective:  Temp:  [97.2  F (36.2  C)-98  F (36.7  C)] 97.9  F (36.6  C)  Pulse:  [61-75] 67  Resp:  [13-20] 18  BP: (106-140)/(51-69) 133/63  SpO2:  [92 %-99 %] 98 %  Wt Readings from Last 4 Encounters:   04/24/24 91.6 kg (202 lb)   06/22/22 91.1 kg (200 lb 12.8 oz)   12/18/14 105.2 kg (232 lb)   09/24/14 106.6 kg (235 lb)     Body mass index is 35.78 kg/m .    General Appearance: Alert and wake, not in distress  Respiratory: clear lungs, no crackles or wheezing  Cardiovascular: rhythmic, normal S1 and S2, no murmur  GI: soft, non-tender, normal bowel sound  Neurology: oriented x 3  Psych: cooperative and calm, normal affect    Medications:   Personally Reviewed.  Medications   Current Facility-Administered Medications   Medication " "Dose Route Frequency Provider Last Rate Last Admin    BUPivacaine liposome (EXPAREL) LA inj was given in the infiltration site to produce post-op analgesia. Duration of action is up to 72 hours. Other \"laverne\" meds should not be given for 96 hours except for lidocaine 4% patch. This is for INFORMATION ONLY.   Does not apply Continuous PRN Nadege Licea MD        lactated ringers infusion   Intravenous Continuous Adamaris Daily PA-C   Stopped at 04/25/24 0601     Current Facility-Administered Medications   Medication Dose Route Frequency Provider Last Rate Last Admin    acetaminophen (TYLENOL) tablet 975 mg  975 mg Oral Q8H Adamaris Daily PA-C   975 mg at 04/25/24 0835    aspirin EC tablet 81 mg  81 mg Oral BID Adamaris Daily PA-C   81 mg at 04/25/24 0836    atorvastatin (LIPITOR) tablet 10 mg  10 mg Oral QPM Cynthia Ponce MD   10 mg at 04/24/24 2009    famotidine (PEPCID) tablet 20 mg  20 mg Oral Daily Cynthia Ponce MD   20 mg at 04/25/24 0836    FLUoxetine (PROzac) capsule 20 mg  20 mg Oral Daily Cynthia Ponce MD   20 mg at 04/25/24 0836    lisinopril (ZESTRIL) tablet 20 mg  20 mg Oral QPM Cynthia Ponce MD        loratadine (CLARITIN) tablet 10 mg  10 mg Oral Daily Cynthia Ponce MD   10 mg at 04/25/24 0836    polyethylene glycol (MIRALAX) Packet 17 g  17 g Oral Daily Adamaris Daily PA-C   17 g at 04/25/24 0836    senna-docusate (SENOKOT-S/PERICOLACE) 8.6-50 MG per tablet 1 tablet  1 tablet Oral BID Adamaris Daily PA-C   1 tablet at 04/25/24 0836    sodium chloride (PF) 0.9% PF flush 3 mL  3 mL Intracatheter Q8H Adamaris Daily PA-C   3 mL at 04/25/24 0600    triamterene-HCTZ (MAXZIDE-25) 37.5-25 MG per tablet 1 tablet  1 tablet Oral Daily Cynthia Ponce MD   1 tablet at 04/25/24 0836       Data reviewed today: I personally reviewed all new medications, labs, imaging/diagnostics reports over the past 24 hours. Pertinent findings include:    Imaging: "   Recent Results (from the past 24 hour(s))   XR Shoulder Left Port G/E 2 Views    Narrative    EXAM: XR SHOULDER LEFT PORT G/E 2 VIEWS  LOCATION: [facility]  DATE: 4/24/2024    INDICATION: Status post surgery  COMPARISON: None.      Impression    IMPRESSION: Postop changes of a left reverse total shoulder arthroplasty. No fracture. Normal alignment.       Labs:  XR Shoulder Left Port G/E 2 Views   Final Result   IMPRESSION: Postop changes of a left reverse total shoulder arthroplasty. No fracture. Normal alignment.      POC US Guidance Needle Placement   Final Result        Recent Results (from the past 24 hour(s))   Hemoglobin    Collection Time: 04/25/24  7:29 AM   Result Value Ref Range    Hemoglobin 12.7 11.7 - 15.7 g/dL   Glucose    Collection Time: 04/25/24  7:29 AM   Result Value Ref Range    Glucose 143 (H) 70 - 99 mg/dL    Patient Fasting > 8hrs? Unknown        Pending Labs:  Unresulted Labs Ordered in the Past 30 Days of this Admission       No orders found for last 31 day(s).              SOLEDAD ESCOBEDO MD  Hospitalist  [facility] Medicine  [facility]  Phone: #[phone]    Securely message me with [employer] (more info)

## 2024-04-25 NOTE — DISCHARGE SUMMARY
"ORTHOPEDIC DISCHARGE SUMMARY       Fatimah Winkler,  1950, MRN [id number]    Admission Date: 2024      Admission Diagnoses: Left shoulder pain [M25.512]  Osteoarthritis of left shoulder [M19.012]     Discharge Date:  24     Post-operative Day:  1 Day Post-Op    Reason for Admission: The patient was admitted for the following: Procedure(s):  LEFT REVERSE TOTAL SHOULDER ARTHROPLASTY    BRIEF HOSPITAL COURSE   Fatimah Winkler is a pleasant 74 year old female who underwent the aforementioned procedure with Dr. Kraus on . There were no intraoperative complications and the patient was transferred to the recovery room and later the orthopedic unit in stable condition. Once the patient reached the orthopedic floor our orthopedic pain protocol was implemented along with the following:    Anticoagulation Medications: ASA  Therapy: PT and OT  Activity: NWB  Bracing: Slingshot Brace    Consultations during Admission: Hospitalist service for medical management     COMPLICATIONS/SIGNIFICANT FINDINGS    NONE    DISCHARGE INFORMATION   Condition at discharge: Good  Discharge destination: Home  Patient was seen by myself on the date of discharge.    FOLLOW UP CARE   Follow up with orthopedics in 2 weeks or sooner should the need arise. Ortho will continue to manage pain control, post op anticoagulation and incision care.     Follow up with your PCP for management of chronic medical problems and to evaluate for post op medical complications including constipation, nausea/vomiting, DVT/PE, anemia, changes in blood pressure, fevers/chills, urinary retention and atelectasis/pneumonia.     Subjective   Patient is doing well on POD #1. Pain is well controlled with oral medications. Ambulating. Tolerating oral intake.     Physical Exam   /63 (BP Location: Right arm)   Pulse 62   Temp 97.2  F (36.2  C) (Oral)   Resp 16   Ht 1.6 m (5' 3\")   Wt 91.6 kg (202 lb)   SpO2 96%   BMI 35.78 kg/m    The patient " is A&Ox3. Appears comfortable, sitting up at bedside & dressed. Wearing sling appropriately.  Sensation is intact to light touch in left upper extremity, hand & fingers.  left upper extremity motor strength 5/5 in ulnar, median and radial nerve distributions. Fires axillary. Flexes elbow. Flexes & extends wrist. Full composite fist. Opposes thumb.  Palpable left radial pulse. Hand warm with brisk cap refill in fingers.  Calves are soft and non-tender.   Mild edema & ecchymosis of left shoulder. The incision is covered. Dressing C/D/I.     Pertinent Results at Discharge     Hemoglobin   Date/Time Value Ref Range Status   04/25/2024 07:29 AM 12.7 11.7 - 15.7 g/dL Final   04/24/2024 08:12 AM 13.9 11.7 - 15.7 g/dL Final   06/23/2022 05:52 AM 11.4 (L) 11.7 - 15.7 g/dL Final     INR   Date/Time Value Ref Range Status   04/24/2024 08:12 AM 0.94 0.85 - 1.15 Final   06/22/2022 08:30 AM 1.02 0.85 - 1.15 Final     Platelet Count   Date/Time Value Ref Range Status   04/24/2024 08:12  150 - 450 10e3/uL Final   06/22/2022 08:30  150 - 450 10e3/uL Final       Problem List   Active Problems:    Status post shoulder surgery      Dalila Wilson PA-C/Dr. Kraus  [facility]  [phone]  Date: 4/25/2024  Time: 8:40 AM

## 2024-04-25 NOTE — PLAN OF CARE
DISCHARGE LOUNGE NOTE    Patient discharged to home, instructions reviewed in the lounge at 11:27 AM. Discharge instructions reviewed with patient, opportunity offered to ask questions. Prescriptions sent with patient to fill, a total of 3 paper scripts were sent with pt. All belongings sent with patient. Sister to transport pt home, and assist with cares.     Girish Almonte RN

## 2024-11-18 ENCOUNTER — LAB REQUISITION (OUTPATIENT)
Dept: LAB | Facility: CLINIC | Age: 74
End: 2024-11-18
Payer: MEDICARE

## 2024-11-18 DIAGNOSIS — I10 ESSENTIAL (PRIMARY) HYPERTENSION: ICD-10-CM

## 2024-11-18 DIAGNOSIS — E55.9 VITAMIN D DEFICIENCY, UNSPECIFIED: ICD-10-CM

## 2024-11-18 DIAGNOSIS — E78.2 MIXED HYPERLIPIDEMIA: ICD-10-CM

## 2024-11-18 LAB
ANION GAP SERPL CALCULATED.3IONS-SCNC: 12 MMOL/L (ref 7–15)
BUN SERPL-MCNC: 19.1 MG/DL (ref 8–23)
CALCIUM SERPL-MCNC: 9.6 MG/DL (ref 8.8–10.4)
CHLORIDE SERPL-SCNC: 102 MMOL/L (ref 98–107)
CHOLEST SERPL-MCNC: 174 MG/DL
CREAT SERPL-MCNC: 0.83 MG/DL (ref 0.51–0.95)
EGFRCR SERPLBLD CKD-EPI 2021: 74 ML/MIN/1.73M2
FASTING STATUS PATIENT QL REPORTED: NO
FASTING STATUS PATIENT QL REPORTED: NO
GLUCOSE SERPL-MCNC: 100 MG/DL (ref 70–99)
HCO3 SERPL-SCNC: 24 MMOL/L (ref 22–29)
HDLC SERPL-MCNC: 56 MG/DL
LDLC SERPL CALC-MCNC: 93 MG/DL
NONHDLC SERPL-MCNC: 118 MG/DL
POTASSIUM SERPL-SCNC: 3.9 MMOL/L (ref 3.4–5.3)
SODIUM SERPL-SCNC: 138 MMOL/L (ref 135–145)
TRIGL SERPL-MCNC: 123 MG/DL
VIT D+METAB SERPL-MCNC: 67 NG/ML (ref 20–50)

## 2024-11-18 PROCEDURE — 82306 VITAMIN D 25 HYDROXY: CPT | Mod: ORL | Performed by: FAMILY MEDICINE

## 2024-11-18 PROCEDURE — 80048 BASIC METABOLIC PNL TOTAL CA: CPT | Mod: ORL | Performed by: FAMILY MEDICINE

## 2024-11-18 PROCEDURE — 80061 LIPID PANEL: CPT | Mod: ORL | Performed by: FAMILY MEDICINE

## 2025-01-18 ENCOUNTER — HEALTH MAINTENANCE LETTER (OUTPATIENT)
Age: 75
End: 2025-01-18

## (undated) DEVICE — GLOVE UNDER INDICATOR PI SZ 7.0 LF 41670

## (undated) DEVICE — SUTURE VICRYL+ 0 27IN CT-1 UND VCP260H

## (undated) DEVICE — SOL NACL 0.9% IRRIG 1000ML BOTTLE 2F7124

## (undated) DEVICE — STPL SKIN PROXIMATE 35 WIDE PMW35

## (undated) DEVICE — IMMOBILIZER SUPER SLING LG B&C 08148294

## (undated) DEVICE — SOL WATER IRRIG 1000ML BOTTLE 2F7114

## (undated) DEVICE — SUCTION MANIFOLD NEPTUNE 2 SYS 1 PORT 702-025-000

## (undated) DEVICE — DRSG ABD TNDRSRB WET PRUF 8IN X 10IN STRL  9194A

## (undated) DEVICE — DRESSING MEPILEX BORDER POST-OP 4X8

## (undated) DEVICE — CLEANSER JET LAVAGE IRRISEPT 0.05% CHG IRRISEPT45USA

## (undated) DEVICE — PREP CHLORAPREP W/ORANGE TINT 10.5ML 930715

## (undated) DEVICE — GLOVE UNDER INDICATOR PI SZ 8.5 LF 41685

## (undated) DEVICE — GLOVE BIOGEL PI ULTRATOUCH G SZ 8.5 42185

## (undated) DEVICE — SU FIBERWIRE 2 38" T-8 NDL  AR-7206

## (undated) DEVICE — GUIDEWIRE TORNIER AEQUALIS PERFORM +  2.5X220MM DWD017

## (undated) DEVICE — SU ETHIBOND 2 V-37 4X30" MX69G

## (undated) DEVICE — NEEDLE SUTURE ANCHOR 1824-4DC

## (undated) DEVICE — GUIDE PIN STERILE 3X75MM

## (undated) DEVICE — ELECTRODE PATIENT RETURN ADULT L10 FT 2 PLATE CORD 0855C

## (undated) DEVICE — DRSG MEPILEX BORDER ADHS 10CMX20CM STRL 496405

## (undated) DEVICE — A3 SUPPLIES- SEE NURSING INFO PAGE

## (undated) DEVICE — SUTURE VICRYL+ 2-0 27IN CT-1 UND VCP259H

## (undated) DEVICE — GLOVE BIOGEL PI ULTRATOUCH G SZ 7.0 42170

## (undated) DEVICE — CUSTOM PACK OPEN SHOULDER SOP5BOSHEB

## (undated) DEVICE — Device

## (undated) DEVICE — GLOVE BIOGEL PI ULTRATOUCH G SZ 6.5 42165

## (undated) DEVICE — SU MONOCRYL 3-0 PS-2 18" UND MCP497G

## (undated) DEVICE — DRILL BIT PERIPHERAL SCREW 3.2MM MWJ126

## (undated) DEVICE — BLADE SAGITTAL WIDE (SO-618) 2108-118

## (undated) DEVICE — SPONGE RAY-TEC 4X8" 7318

## (undated) DEVICE — PLATE GROUNDING ADULT W/CORD 9165L

## (undated) DEVICE — POSITIONER HEAD ADULT FP-HEADCR

## (undated) DEVICE — SPONGE LAP 8X4IN 12 PLY RADOPQ DERMACEA STRL BLU WHT

## (undated) DEVICE — HOLDER LIMB VELCRO OR 0814-1533

## (undated) DEVICE — BLADE SAW SAGITTAL STRK WIDE 25.4X85X1.2MM 2108-151-000

## (undated) RX ORDER — FENTANYL CITRATE-0.9 % NACL/PF 10 MCG/ML
PLASTIC BAG, INJECTION (ML) INTRAVENOUS
Status: DISPENSED
Start: 2022-06-22

## (undated) RX ORDER — FENTANYL CITRATE 50 UG/ML
INJECTION, SOLUTION INTRAMUSCULAR; INTRAVENOUS
Status: DISPENSED
Start: 2024-04-24

## (undated) RX ORDER — DEXAMETHASONE SODIUM PHOSPHATE 10 MG/ML
INJECTION, SOLUTION INTRAMUSCULAR; INTRAVENOUS
Status: DISPENSED
Start: 2022-06-22

## (undated) RX ORDER — PROPOFOL 10 MG/ML
INJECTION, EMULSION INTRAVENOUS
Status: DISPENSED
Start: 2024-04-24

## (undated) RX ORDER — PROPOFOL 10 MG/ML
INJECTION, EMULSION INTRAVENOUS
Status: DISPENSED
Start: 2022-06-22

## (undated) RX ORDER — FENTANYL CITRATE 50 UG/ML
INJECTION, SOLUTION INTRAMUSCULAR; INTRAVENOUS
Status: DISPENSED
Start: 2022-06-22

## (undated) RX ORDER — DEXAMETHASONE SODIUM PHOSPHATE 10 MG/ML
INJECTION, EMULSION INTRAMUSCULAR; INTRAVENOUS
Status: DISPENSED
Start: 2024-04-24

## (undated) RX ORDER — EPHEDRINE SULFATE 50 MG/ML
INJECTION, SOLUTION INTRAMUSCULAR; INTRAVENOUS; SUBCUTANEOUS
Status: DISPENSED
Start: 2022-06-22

## (undated) RX ORDER — CEFAZOLIN SODIUM 1 G/3ML
INJECTION, POWDER, FOR SOLUTION INTRAMUSCULAR; INTRAVENOUS
Status: DISPENSED
Start: 2024-04-24

## (undated) RX ORDER — EPHEDRINE SULFATE 50 MG/ML
INJECTION, SOLUTION INTRAMUSCULAR; INTRAVENOUS; SUBCUTANEOUS
Status: DISPENSED
Start: 2024-04-24

## (undated) RX ORDER — LIDOCAINE HYDROCHLORIDE 10 MG/ML
INJECTION, SOLUTION EPIDURAL; INFILTRATION; INTRACAUDAL; PERINEURAL
Status: DISPENSED
Start: 2022-06-22

## (undated) RX ORDER — LIDOCAINE HYDROCHLORIDE 10 MG/ML
INJECTION, SOLUTION EPIDURAL; INFILTRATION; INTRACAUDAL; PERINEURAL
Status: DISPENSED
Start: 2024-04-24

## (undated) RX ORDER — ONDANSETRON 2 MG/ML
INJECTION INTRAMUSCULAR; INTRAVENOUS
Status: DISPENSED
Start: 2022-06-22